# Patient Record
Sex: MALE | Race: WHITE | Employment: FULL TIME | URBAN - METROPOLITAN AREA
[De-identification: names, ages, dates, MRNs, and addresses within clinical notes are randomized per-mention and may not be internally consistent; named-entity substitution may affect disease eponyms.]

---

## 2017-02-03 DIAGNOSIS — Z72.0 TOBACCO USE: ICD-10-CM

## 2017-02-03 RX ORDER — VARENICLINE TARTRATE 1 MG/1
1 TABLET, FILM COATED ORAL 2 TIMES DAILY
Qty: 60 TAB | Refills: 2 | Status: SHIPPED | OUTPATIENT
Start: 2017-02-03 | End: 2017-07-14 | Stop reason: ALTCHOICE

## 2017-07-14 ENCOUNTER — OFFICE VISIT (OUTPATIENT)
Dept: FAMILY MEDICINE CLINIC | Age: 57
End: 2017-07-14

## 2017-07-14 VITALS
SYSTOLIC BLOOD PRESSURE: 120 MMHG | OXYGEN SATURATION: 93 % | HEIGHT: 72 IN | BODY MASS INDEX: 31.29 KG/M2 | WEIGHT: 231 LBS | TEMPERATURE: 97.8 F | HEART RATE: 72 BPM | RESPIRATION RATE: 16 BRPM | DIASTOLIC BLOOD PRESSURE: 80 MMHG

## 2017-07-14 DIAGNOSIS — R74.01 TRANSAMINITIS: ICD-10-CM

## 2017-07-14 DIAGNOSIS — G89.29 CHRONIC PAIN OF LEFT ANKLE: ICD-10-CM

## 2017-07-14 DIAGNOSIS — Z87.891 CESSATION OF TOBACCO USE IN PREVIOUS 12 MONTHS: ICD-10-CM

## 2017-07-14 DIAGNOSIS — R73.03 PREDIABETES: ICD-10-CM

## 2017-07-14 DIAGNOSIS — E83.110 HEREDITARY HEMOCHROMATOSIS (HCC): ICD-10-CM

## 2017-07-14 DIAGNOSIS — R79.89 LOW TESTOSTERONE LEVEL IN MALE: ICD-10-CM

## 2017-07-14 DIAGNOSIS — M25.572 CHRONIC PAIN OF LEFT ANKLE: ICD-10-CM

## 2017-07-14 DIAGNOSIS — N40.1 BENIGN PROSTATIC HYPERPLASIA WITH LOWER URINARY TRACT SYMPTOMS, UNSPECIFIED MORPHOLOGY: Primary | ICD-10-CM

## 2017-07-14 DIAGNOSIS — N52.1 ERECTILE DYSFUNCTION DUE TO DISEASES CLASSIFIED ELSEWHERE: ICD-10-CM

## 2017-07-14 RX ORDER — SILDENAFIL 50 MG/1
50 TABLET, FILM COATED ORAL AS NEEDED
Qty: 9 TAB | Refills: 11 | Status: SHIPPED | OUTPATIENT
Start: 2017-07-14 | End: 2017-07-31 | Stop reason: SDUPTHER

## 2017-07-14 RX ORDER — SILDENAFIL 50 MG/1
50 TABLET, FILM COATED ORAL AS NEEDED
Qty: 9 TAB | Refills: 11 | Status: SHIPPED | OUTPATIENT
Start: 2017-07-14 | End: 2017-07-14 | Stop reason: SDUPTHER

## 2017-07-14 NOTE — MR AVS SNAPSHOT
Visit Information Date & Time Provider Department Dept. Phone Encounter #  
 7/14/2017  3:15 PM Sivan Scott [address] [phone] [id number] Upcoming Health Maintenance Date Due Pneumococcal 19-64 Medium Risk (1 of 1 - PPSV23) 3/28/1977 INFLUENZA AGE 9 TO ADULT 8/1/2017 COLONOSCOPY 1/28/2021 COLON CANCER SCRN (BARIUM / CT COLO / FLX SIG) Q5 2/24/2021 DTaP/Tdap/Td series (2 - Td) 12/4/2023 Allergies as of 7/14/2017  Review Complete On: 7/14/2017 By: Radha Jacobs LPN Severity Noted Reaction Type Reactions Codeine Low 08/11/2014   Systemic Itching Current Immunizations  Never Reviewed Name Date Influenza Vaccine 1/19/2016 Influenza Vaccine (Quad) PF 10/31/2016 Influenza Vaccine PF 1/29/2015 Tdap 12/4/2013  4:19 PM  
  
 Not reviewed this visit You Were Diagnosed With   
  
 Codes Comments Benign prostatic hyperplasia with lower urinary tract symptoms, unspecified morphology    -  Primary ICD-10-CM: N40.1 ICD-9-CM: 600.01 Transaminitis     ICD-10-CM: R74.0 ICD-9-CM: 790.4 Prediabetes     ICD-10-CM: R73.03 
ICD-9-CM: 790.29 Cessation of tobacco use in previous 12 months     ICD-10-CM: Z87.891 ICD-9-CM: V15.82 Chronic pain of left ankle     ICD-10-CM: M25.572, G89.29 ICD-9-CM: 719.47, 338.29 Hereditary hemochromatosis ([facility] [address]     ICD-10-CM: E83.110 
ICD-9-CM: 275.01 Erectile dysfunction due to diseases classified elsewhere     ICD-10-CM: N52.1 ICD-9-CM: 607.84 Low testosterone level in male     ICD-10-CM: E29.1 ICD-9-CM: 257.2 Vitals BP Pulse Temp Resp Height(growth percentile) Weight(growth percentile) 120/80 (BP 1 Location: Left arm, BP Patient Position: Sitting) 72 97.8 °F (36.6 °C) (Oral) 16 6' (1.829 m) 231 lb (104.8 kg) SpO2 BMI Smoking Status 93% 31.33 kg/m2 Former Smoker Vitals History BMI and BSA Data Body Mass Index Body Surface Area  
 31.33 kg/m 2 2.31 m 2 Preferred Pharmacy Pharmacy Name Phone [facility] [address] [place], [place] [phone] Your Updated Medication List  
  
   
This list is accurate as of: 7/14/17  3:44 PM.  Always use your most recent med list.  
  
  
  
  
 diclofenac EC 75 mg EC tablet Commonly known as:  VOLTAREN Take 1 Tab by mouth two (2) times daily as needed. sildenafil citrate 50 mg tablet Commonly known as:  VIAGRA Take 1 Tab by mouth as needed. Prescriptions Sent to Pharmacy Refills  
 sildenafil citrate (VIAGRA) 50 mg tablet 11 Sig: Take 1 Tab by mouth as needed. Class: Normal  
 Pharmacy: [address], [place] [address].  #: [phone] Route: Oral  
  
We Performed the Following REFERRAL TO ORTHOPEDICS [ZBU689 Custom] Comments:  
 Please evaluate patient for chronic L ankle pain, evidence of inflammatory arthritis on 2014 MRI, hemochromatosis. Zoë weiss To-Do List   
 07/14/2017 Lab:  HEMOGLOBIN A1C W/O EAG   
  
 07/14/2017 Lab:  NICOTINE/COTININE, UR, QT   
  
 07/14/2017 Lab:  PSA W/ REFLX FREE PSA Referral Information Referral ID Referred By Referred To  
  
 [id number] Norma Rocha V Not Available Visits Status Start Date End Date 1 New Request 7/14/17 7/14/18 If your referral has a status of pending review or denied, additional information will be sent to support the outcome of this decision. Introducing [facility] & HEALTH SERVICES! Dear Kam Zendejasore: Thank you for requesting a [employer] account. Our records indicate that you already have an active [employer] account. You can access your account anytime at [email or web address] Did you know that you can access your hospital and ER discharge instructions at any time in [employer]?   You can also review all of your test results from your hospital stay or ER visit. Additional Information If you have questions, please visit the Frequently Asked Questions section of the myL[employer]O website at [email or web address] Remember, [employer] is NOT to be used for urgent needs. For medical emergencies, dial 911. Now available from your iPhone and Android! Please provide this summary of care documentation to your next provider. Your primary care clinician is listed as Miguelito Taylor. If you have any questions after today's visit, please call [phone].

## 2017-07-14 NOTE — PROGRESS NOTES
Assessment/Plan:    1. Benign prostatic hyperplasia with lower urinary tract symptoms, unspecified morphology. Will also check A1c as polyuria may be from DM  - PSA W/ REFLX FREE PSA; Future  -declined flomax    2. Transaminitis  -secondary to hemochromatosis and fatty liver    3. Prediabetes  - HEMOGLOBIN A1C W/O EAG; Future    4. Cessation of tobacco use in previous 12 months  - NICOTINE/COTININE, UR, QT; Future    5. Chronic pain of left ankle- in setting of Hereditary hemochromatosis and inflammatory arthritis on MRI  - REFERRAL TO ORTHOPEDICS    6. Erectile dysfunction due to diseases classified elsewhere- related to low testosterone  - sildenafil citrate (VIAGRA) 50 mg tablet; Take 1 Tab by mouth as needed. Dispense: 9 Tab; Refill: 11    7. Low T- given fatigue and ED, will rechk. Pt wishes to consider trial of tesosterone. The plan was discussed with the patient. The patient verbalized understanding and is in agreement with the plan. All medication potential side effects were discussed with the patient. Health Maintenance:   Health Maintenance   Topic Date Due    Pneumococcal 19-64 Medium Risk (1 of 1 - PPSV23) 03/28/1977    INFLUENZA AGE 9 TO ADULT  08/01/2017    COLONOSCOPY  01/28/2021    COLON CANCER SCRN (BARIUM / CT COLO / FLX SIG) Q5  02/24/2021    DTaP/Tdap/Td series (2 - Td) 12/04/2023    Hepatitis C Screening  Completed       Shaheed Simpson is a 59 y.o. male and presents with Ankle Pain; Urinary Frequency; and Hemorrhoids     Subjective:  Pt c/o continued L ankle pain. Has hemochromatosis. Pt notes he has hemorrhoids. Has intermittent maroon colored stools that he thinks is related to his hemorrhoids. Up to date with colo. Pt c/o difficulty emptying his bladder, dribbling, difficulty starting his stream, polyuria, nocturia. Has erectile dysfunction. States he has low energy. Wonders if it's related to his low testosterone.       Quit smoking and wishes to have nicotine test for insurance purposes. ROS:  Constitutional: No recent weight change. No weakness/+fatigue. No f/c. Cardiovascular: No CP/palpitations. No CARDOZA/orthopnea/PND. Respiratory: No cough/sputum, dyspnea, wheezing. Gastointestinal: No dysphagia, reflux. No n/v. No constipation/diarrhea.  +rectal bleeding. Genitourinary: No dysuria, urinary hesitancy, nocturia, hematuria. No incontinence. Musculoskeletal: + joint pain/stiffness. No muscle pain/tenderness. The problem list was updated as a part of today's visit. Patient Active Problem List   Diagnosis Code    Tobacco dependence F17.200    Transaminitis R74.0    Hepatomegaly R16.0    Prehypertension R03.0    Bilateral wrist pain M25.531, M25.532    History of carpal tunnel release Z98.890    Exposure to hepatitis B Z20.5    Post-traumatic osteonecrosis of left ankle (HCC) M87.272    HLD (hyperlipidemia) E78.5    Hemochromatosis R66.211    Mild diastolic dysfunction D74.0    Prediabetes R73.03       The PSH, FH were reviewed. SH:  Social History   Substance Use Topics    Smoking status: Former Smoker     Packs/day: 0.50     Years: 45.00     Types: Cigarettes     Quit date: 5/1/2016    Smokeless tobacco: Never Used    Alcohol use Yes      Comment: very little       Medications/Allergies:  Current Outpatient Prescriptions on File Prior to Visit   Medication Sig Dispense Refill    diclofenac EC (VOLTAREN) 75 mg EC tablet Take 1 Tab by mouth two (2) times daily as needed. 180 Tab 3     No current facility-administered medications on file prior to visit.          Allergies   Allergen Reactions    Codeine Itching       Objective:  Visit Vitals    /80 (BP 1 Location: Left arm, BP Patient Position: Sitting)    Pulse 72    Temp 97.8 °F (36.6 °C) (Oral)    Resp 16    Ht 6' (1.829 m)    Wt 231 lb (104.8 kg)    SpO2 93%    BMI 31.33 kg/m2      Constitutional: Well developed, nourished, no distress, alert, obese habitus   CV: S1, S2.  RRR. No murmurs/rubs. No thrills palpated. No carotid bruits. Intact distal pulses. Pulm: No abnormalities on inspection. Clear to auscultation bilaterally. No wheezing/rhonchi. Normal effort. GI: Soft, nontender, nondistended. Normal active bowel sounds. MS: Gait antalgic. 1+ ankle and pedal edema on L. Labwork and Ancillary Studies:    CBC w/Diff  Lab Results   Component Value Date/Time    WBC 7.4 10/06/2016 07:04 AM    HGB 15.7 10/06/2016 07:04 AM    PLATELET 572 08/04/8763 07:04 AM         Basic Metabolic Profile/LFTs  Lab Results   Component Value Date/Time    Sodium 140 10/06/2016 07:04 AM    Potassium 4.4 10/06/2016 07:04 AM    Chloride 106 10/06/2016 07:04 AM    CO2 27 10/06/2016 07:04 AM    Anion gap 7 10/06/2016 07:04 AM    Glucose 127 10/06/2016 07:04 AM    BUN 19 10/06/2016 07:04 AM    Creatinine 1.08 10/06/2016 07:04 AM    BUN/Creatinine ratio 18 10/06/2016 07:04 AM    GFR est AA >60 10/06/2016 07:04 AM    GFR est non-AA >60 10/06/2016 07:04 AM    Calcium 8.8 10/06/2016 07:04 AM      Lab Results   Component Value Date/Time    ALT (SGPT) 64 10/06/2016 07:04 AM    AST (SGOT) 45 10/06/2016 07:04 AM    Alk.  phosphatase 91 10/06/2016 07:04 AM    Bilirubin, total 1.0 10/06/2016 07:04 AM       Cholesterol  Lab Results   Component Value Date/Time    Cholesterol, total 207 10/06/2016 07:04 AM    HDL Cholesterol 44 10/06/2016 07:04 AM    LDL, calculated 102.8 10/06/2016 07:04 AM    Triglyceride 301 10/06/2016 07:04 AM    CHOL/HDL Ratio 4.7 10/06/2016 07:04 AM

## 2017-07-14 NOTE — PROGRESS NOTES
Gurmeet Sharma is a 59 y.o. male here today for ankle pain , urinary /prostate issue     1. Have you been to the ER, urgent care clinic or hospitalized since your last visit? NO.     2. Have you seen or consulted any other health care providers outside of the [facility] since your last visit (Include any pap smears or colon screening)? YES      Do you have an Advanced Directive? NO    Would you like information on Advanced Directives?  NO    Learning Assessment 2/10/2015   PRIMARY LEARNER Patient   HIGHEST LEVEL OF EDUCATION - PRIMARY LEARNER  > 4 YEARS OF COLLEGE   BARRIERS PRIMARY LEARNER NONE   CO-LEARNER CAREGIVER No   PRIMARY LANGUAGE ENGLISH   LEARNER PREFERENCE PRIMARY DEMONSTRATION     -   ANSWERED BY patient   RELATIONSHIP SELF

## 2017-07-26 ENCOUNTER — HOSPITAL ENCOUNTER (OUTPATIENT)
Dept: LAB | Age: 57
Discharge: HOME | End: 2017-07-26
Payer: COMMERCIAL

## 2017-07-26 DIAGNOSIS — R73.03 PREDIABETES: ICD-10-CM

## 2017-07-26 DIAGNOSIS — Z87.891 CESSATION OF TOBACCO USE IN PREVIOUS 12 MONTHS: ICD-10-CM

## 2017-07-26 DIAGNOSIS — R79.89 LOW TESTOSTERONE LEVEL IN MALE: ICD-10-CM

## 2017-07-26 DIAGNOSIS — N40.1 BENIGN PROSTATIC HYPERPLASIA WITH LOWER URINARY TRACT SYMPTOMS, UNSPECIFIED MORPHOLOGY: ICD-10-CM

## 2017-07-26 LAB — HBA1C MFR BLD: 10.7 % (ref 4.2–5.6)

## 2017-07-26 PROCEDURE — 84403 ASSAY OF TOTAL TESTOSTERONE: CPT | Performed by: INTERNAL MEDICINE

## 2017-07-26 PROCEDURE — 80307 DRUG TEST PRSMV CHEM ANLYZR: CPT | Performed by: INTERNAL MEDICINE

## 2017-07-26 PROCEDURE — 84153 ASSAY OF PSA TOTAL: CPT | Performed by: INTERNAL MEDICINE

## 2017-07-26 PROCEDURE — 83002 ASSAY OF GONADOTROPIN (LH): CPT | Performed by: INTERNAL MEDICINE

## 2017-07-26 PROCEDURE — 83036 HEMOGLOBIN GLYCOSYLATED A1C: CPT | Performed by: INTERNAL MEDICINE

## 2017-07-26 PROCEDURE — 36415 COLL VENOUS BLD VENIPUNCTURE: CPT | Performed by: INTERNAL MEDICINE

## 2017-07-27 LAB
COTININE UR QL SCN: NEGATIVE NG/ML
DRUG SCREEN COMMENT:, 753798: NORMAL
PSA SERPL-MCNC: 0.3 NG/ML (ref 0–4)
REFLEX CRITERIA: NORMAL
TESTOST FREE SERPL-MCNC: 6.8 PG/ML (ref 7.2–24)
TESTOST SERPL-MCNC: 170 NG/DL (ref 264–916)

## 2017-07-28 DIAGNOSIS — E29.1 HYPOGONADISM IN MALE: Primary | ICD-10-CM

## 2017-07-28 DIAGNOSIS — E83.110 HEREDITARY HEMOCHROMATOSIS (HCC): ICD-10-CM

## 2017-07-28 LAB — LH SERPL-ACNC: 5.7 MIU/ML

## 2017-07-28 RX ORDER — METFORMIN HYDROCHLORIDE 500 MG/1
500 TABLET, EXTENDED RELEASE ORAL
Qty: 90 TAB | Refills: 0 | Status: SHIPPED | OUTPATIENT
Start: 2017-07-28 | End: 2017-07-31 | Stop reason: SDUPTHER

## 2017-07-31 DIAGNOSIS — N52.1 ERECTILE DYSFUNCTION DUE TO DISEASES CLASSIFIED ELSEWHERE: ICD-10-CM

## 2017-07-31 RX ORDER — METFORMIN HYDROCHLORIDE 500 MG/1
500 TABLET, EXTENDED RELEASE ORAL
Qty: 90 TAB | Refills: 0 | Status: SHIPPED | OUTPATIENT
Start: 2017-07-31 | End: 2017-10-27 | Stop reason: SDUPTHER

## 2017-07-31 RX ORDER — SILDENAFIL 50 MG/1
50 TABLET, FILM COATED ORAL AS NEEDED
Qty: 9 TAB | Refills: 11 | Status: SHIPPED | OUTPATIENT
Start: 2017-07-31 | End: 2017-10-17 | Stop reason: SDUPTHER

## 2017-08-18 ENCOUNTER — TELEPHONE (OUTPATIENT)
Dept: FAMILY MEDICINE CLINIC | Age: 57
End: 2017-08-18

## 2017-08-18 RX ORDER — INSULIN PUMP SYRINGE, 3 ML
EACH MISCELLANEOUS
Qty: 1 KIT | Refills: 0 | Status: CANCELLED | OUTPATIENT
Start: 2017-08-18

## 2017-08-18 RX ORDER — LANCETS
EACH MISCELLANEOUS
Qty: 1 EACH | Refills: 3 | Status: CANCELLED | OUTPATIENT
Start: 2017-08-18

## 2017-08-18 NOTE — TELEPHONE ENCOUNTER
Pt is requesting a Rx for a new blood sugar meter, test strips and lancets. Pt is not sure what brand his insurance will cover.   Pt has [facility]

## 2017-08-23 RX ORDER — INSULIN PUMP SYRINGE, 3 ML
EACH MISCELLANEOUS
Qty: 1 KIT | Refills: 0 | Status: SHIPPED | OUTPATIENT
Start: 2017-08-23 | End: 2019-01-31 | Stop reason: SDUPTHER

## 2017-08-23 RX ORDER — LANCETS
EACH MISCELLANEOUS
Qty: 100 EACH | Refills: 3 | Status: SHIPPED | OUTPATIENT
Start: 2017-08-23 | End: 2019-01-31 | Stop reason: SDUPTHER

## 2017-08-24 RX ORDER — BLOOD-GLUCOSE METER
1 EACH MISCELLANEOUS DAILY
Qty: 1 EACH | Refills: 0 | Status: SHIPPED | OUTPATIENT
Start: 2017-08-24 | End: 2019-01-31 | Stop reason: SDUPTHER

## 2017-08-24 RX ORDER — BLOOD-GLUCOSE CONTROL, NORMAL
EACH MISCELLANEOUS
Qty: 100 LANCET | Refills: 3 | Status: SHIPPED | OUTPATIENT
Start: 2017-08-24 | End: 2020-01-09 | Stop reason: ALTCHOICE

## 2017-09-08 DIAGNOSIS — R73.03 PREDIABETES: Primary | ICD-10-CM

## 2017-09-08 DIAGNOSIS — E78.00 PURE HYPERCHOLESTEROLEMIA: Primary | ICD-10-CM

## 2017-09-11 ENCOUNTER — HOSPITAL ENCOUNTER (OUTPATIENT)
Dept: LAB | Age: 57
Discharge: HOME | End: 2017-09-11
Payer: COMMERCIAL

## 2017-09-11 DIAGNOSIS — E78.00 PURE HYPERCHOLESTEROLEMIA: ICD-10-CM

## 2017-09-11 DIAGNOSIS — R73.03 PREDIABETES: ICD-10-CM

## 2017-09-11 LAB
CHOLEST SERPL-MCNC: 206 MG/DL
HBA1C MFR BLD: 7.6 % (ref 4.2–5.6)
HDLC SERPL-MCNC: 42 MG/DL (ref 40–60)
HDLC SERPL: 4.9 {RATIO} (ref 0–5)
LDLC SERPL CALC-MCNC: 100.8 MG/DL (ref 0–100)
LIPID PROFILE,FLP: ABNORMAL
TRIGL SERPL-MCNC: 316 MG/DL (ref ?–150)
VLDLC SERPL CALC-MCNC: 63.2 MG/DL

## 2017-09-11 PROCEDURE — 36415 COLL VENOUS BLD VENIPUNCTURE: CPT | Performed by: INTERNAL MEDICINE

## 2017-09-11 PROCEDURE — 80061 LIPID PANEL: CPT | Performed by: INTERNAL MEDICINE

## 2017-09-11 PROCEDURE — 83036 HEMOGLOBIN GLYCOSYLATED A1C: CPT | Performed by: INTERNAL MEDICINE

## 2017-09-27 DIAGNOSIS — E83.110 HEREDITARY HEMOCHROMATOSIS (HCC): ICD-10-CM

## 2017-09-27 DIAGNOSIS — R74.01 TRANSAMINITIS: Primary | ICD-10-CM

## 2017-09-27 RX ORDER — DICLOFENAC SODIUM 75 MG/1
75 TABLET, DELAYED RELEASE ORAL
Qty: 180 TAB | Refills: 3 | Status: SHIPPED | OUTPATIENT
Start: 2017-09-27 | End: 2018-10-24 | Stop reason: SDUPTHER

## 2017-10-05 ENCOUNTER — HOSPITAL ENCOUNTER (OUTPATIENT)
Dept: ULTRASOUND IMAGING | Age: 57
Discharge: HOME | End: 2017-10-05
Attending: INTERNAL MEDICINE
Payer: COMMERCIAL

## 2017-10-05 DIAGNOSIS — R74.01 TRANSAMINITIS: ICD-10-CM

## 2017-10-05 PROCEDURE — 76705 ECHO EXAM OF ABDOMEN: CPT

## 2017-10-12 ENCOUNTER — HOSPITAL ENCOUNTER (OUTPATIENT)
Dept: LAB | Age: 57
Discharge: HOME | End: 2017-10-12
Payer: COMMERCIAL

## 2017-10-12 DIAGNOSIS — R74.01 TRANSAMINITIS: ICD-10-CM

## 2017-10-12 DIAGNOSIS — E83.110 HEREDITARY HEMOCHROMATOSIS (HCC): ICD-10-CM

## 2017-10-12 LAB
ALBUMIN SERPL-MCNC: 3.8 G/DL (ref 3.4–5)
ALBUMIN/GLOB SERPL: 1.1 {RATIO} (ref 0.8–1.7)
ALP SERPL-CCNC: 97 U/L (ref 45–117)
ALT SERPL-CCNC: 73 U/L (ref 16–61)
ANION GAP SERPL CALC-SCNC: 9 MMOL/L (ref 3–18)
AST SERPL-CCNC: 61 U/L (ref 15–37)
BILIRUB SERPL-MCNC: 0.4 MG/DL (ref 0.2–1)
BUN SERPL-MCNC: 24 MG/DL (ref 7–18)
BUN/CREAT SERPL: 23 (ref 12–20)
CALCIUM SERPL-MCNC: 8.9 MG/DL (ref 8.5–10.1)
CHLORIDE SERPL-SCNC: 103 MMOL/L (ref 100–108)
CO2 SERPL-SCNC: 27 MMOL/L (ref 21–32)
CREAT SERPL-MCNC: 1.04 MG/DL (ref 0.6–1.3)
ERYTHROCYTE [DISTWIDTH] IN BLOOD BY AUTOMATED COUNT: 12.9 % (ref 11.6–14.5)
GLOBULIN SER CALC-MCNC: 3.4 G/DL (ref 2–4)
GLUCOSE SERPL-MCNC: 182 MG/DL (ref 74–99)
HCT VFR BLD AUTO: 41.5 % (ref 36–48)
HGB BLD-MCNC: 14 G/DL (ref 13–16)
MCH RBC QN AUTO: 32.4 PG (ref 24–34)
MCHC RBC AUTO-ENTMCNC: 33.7 G/DL (ref 31–37)
MCV RBC AUTO: 96.1 FL (ref 74–97)
PLATELET # BLD AUTO: 194 K/UL (ref 135–420)
PMV BLD AUTO: 9.2 FL (ref 9.2–11.8)
POTASSIUM SERPL-SCNC: 4.5 MMOL/L (ref 3.5–5.5)
PROT SERPL-MCNC: 7.2 G/DL (ref 6.4–8.2)
RBC # BLD AUTO: 4.32 M/UL (ref 4.7–5.5)
SODIUM SERPL-SCNC: 139 MMOL/L (ref 136–145)
WBC # BLD AUTO: 6 K/UL (ref 4.6–13.2)

## 2017-10-12 PROCEDURE — 80053 COMPREHEN METABOLIC PANEL: CPT | Performed by: INTERNAL MEDICINE

## 2017-10-12 PROCEDURE — 85027 COMPLETE CBC AUTOMATED: CPT | Performed by: INTERNAL MEDICINE

## 2017-10-12 PROCEDURE — 82105 ALPHA-FETOPROTEIN SERUM: CPT | Performed by: INTERNAL MEDICINE

## 2017-10-12 PROCEDURE — 36415 COLL VENOUS BLD VENIPUNCTURE: CPT | Performed by: INTERNAL MEDICINE

## 2017-10-13 LAB — AFP-TM SERPL-MCNC: 2.9 NG/ML (ref 0–8.3)

## 2017-10-17 DIAGNOSIS — N52.1 ERECTILE DYSFUNCTION DUE TO DISEASES CLASSIFIED ELSEWHERE: ICD-10-CM

## 2017-10-17 RX ORDER — SILDENAFIL 50 MG/1
50 TABLET, FILM COATED ORAL AS NEEDED
Qty: 9 TAB | Refills: 11 | Status: SHIPPED | OUTPATIENT
Start: 2017-10-17 | End: 2017-10-18 | Stop reason: SDUPTHER

## 2017-10-18 DIAGNOSIS — N52.1 ERECTILE DYSFUNCTION DUE TO DISEASES CLASSIFIED ELSEWHERE: ICD-10-CM

## 2017-10-18 RX ORDER — SILDENAFIL 50 MG/1
50 TABLET, FILM COATED ORAL
Qty: 9 TAB | Refills: 11 | Status: SHIPPED | OUTPATIENT
Start: 2017-10-18 | End: 2019-10-23 | Stop reason: ALTCHOICE

## 2017-10-27 ENCOUNTER — CLINICAL SUPPORT (OUTPATIENT)
Dept: FAMILY MEDICINE CLINIC | Age: 57
End: 2017-10-27

## 2017-10-27 DIAGNOSIS — Z23 ENCOUNTER FOR IMMUNIZATION: Primary | ICD-10-CM

## 2017-10-27 RX ORDER — METFORMIN HYDROCHLORIDE 500 MG/1
500 TABLET, EXTENDED RELEASE ORAL
Qty: 90 TAB | Refills: 0 | Status: SHIPPED | OUTPATIENT
Start: 2017-10-27 | End: 2018-02-27 | Stop reason: SDUPTHER

## 2017-10-27 NOTE — TELEPHONE ENCOUNTER
From: Trinh Merritt  To: Jen Linares MD  Sent: 10/27/2017 8:24 AM EDT  Subject: Medication Renewal Request    Original authorizing provider: MD Trinh Gilbert would like a refill of the following medications:  metFORMIN ER (GLUCOPHAGE XR) 500 mg tablet Jen Linares MD]    Preferred pharmacy: [facility] [address] [place] [address].     Comment:

## 2017-10-27 NOTE — PROGRESS NOTES
Flu shot Immunization/s administered 10/27/2017 by Katarzyna Martíenz LPN with guardian's consent. Patient tolerated procedure well. No reactions noted.

## 2017-11-09 PROBLEM — K74.00 FIBROSIS OF LIVER: Status: ACTIVE | Noted: 2017-11-09

## 2018-02-27 ENCOUNTER — OFFICE VISIT (OUTPATIENT)
Dept: FAMILY MEDICINE CLINIC | Age: 58
End: 2018-02-27

## 2018-02-27 VITALS
SYSTOLIC BLOOD PRESSURE: 116 MMHG | HEIGHT: 72 IN | BODY MASS INDEX: 32.91 KG/M2 | RESPIRATION RATE: 20 BRPM | OXYGEN SATURATION: 94 % | WEIGHT: 243 LBS | HEART RATE: 65 BPM | TEMPERATURE: 97.8 F | DIASTOLIC BLOOD PRESSURE: 70 MMHG

## 2018-02-27 DIAGNOSIS — E66.09 CLASS 1 OBESITY DUE TO EXCESS CALORIES WITH SERIOUS COMORBIDITY AND BODY MASS INDEX (BMI) OF 32.0 TO 32.9 IN ADULT: ICD-10-CM

## 2018-02-27 DIAGNOSIS — E11.9 TYPE 2 DIABETES MELLITUS WITHOUT COMPLICATION, WITHOUT LONG-TERM CURRENT USE OF INSULIN (HCC): Primary | ICD-10-CM

## 2018-02-27 LAB — HBA1C MFR BLD HPLC: 7.5 %

## 2018-02-27 RX ORDER — METFORMIN HYDROCHLORIDE 750 MG/1
750 TABLET, EXTENDED RELEASE ORAL
Qty: 90 TAB | Refills: 0 | Status: SHIPPED | OUTPATIENT
Start: 2018-02-27 | End: 2018-06-12 | Stop reason: SDUPTHER

## 2018-02-27 NOTE — PROGRESS NOTES
Health Maintenance Due   Topic Date Due    ZOSTER VACCINE AGE 60>  2018     Cara Hernandes is a 59 y.o. male (: 1960) presenting to address:    No chief complaint on file. Vitals:    18 0726   BP: 116/70   Pulse: 65   Resp: 20   Temp: 97.8 °F (36.6 °C)   TempSrc: Oral   SpO2: 94%   Weight: 243 lb (110.2 kg)   Height: 6' (1.829 m)   PainSc:   0 - No pain       Hearing/Vision:   No exam data present    Learning Assessment:     Learning Assessment 2/10/2015   PRIMARY LEARNER Patient   HIGHEST LEVEL OF EDUCATION - PRIMARY LEARNER  > 4 YEARS OF COLLEGE   BARRIERS PRIMARY LEARNER NONE   CO-LEARNER CAREGIVER No   PRIMARY LANGUAGE ENGLISH   LEARNER PREFERENCE PRIMARY DEMONSTRATION     -   ANSWERED BY patient   RELATIONSHIP SELF     Depression Screening:     PHQ over the last two weeks 2018   Little interest or pleasure in doing things Not at all   Feeling down, depressed or hopeless Not at all   Total Score PHQ 2 0     Fall Risk Assessment:   No flowsheet data found. Abuse Screening:     Abuse Screening Questionnaire 2/10/2015   Do you ever feel afraid of your partner? N   Are you in a relationship with someone who physically or mentally threatens you? N   Is it safe for you to go home? Y     Coordination of Care Questionaire:   1. Have you been to the ER, urgent care clinic since your last visit? Hospitalized since your last visit? No     2. Have you seen or consulted any other health care providers outside of the [address] [place] [address] since your last visit? Include any pap smears or colon screening. Yes, dilated eye exam and Dr. Lona Cruz hem/oc every 3 months     Advanced Directive:   1. Do you have an Advanced Directive? No     2. Would you like information on Advanced Directives?   Yes

## 2018-02-27 NOTE — PROGRESS NOTES
Assessment/Plan:    1. Type 2 diabetes mellitus without complication, without long-term current use of insulin (HCC)  -incr metformin to 750mg. Get eye note from optometry on [place]. F/u in 3mos. - AMB POC HEMOGLOBIN A1C  -  DIABETES FOOT EXAM  - metFORMIN ER (GLUCOPHAGE XR) 750 mg tablet; Take 1 Tab by mouth daily (with dinner). Dispense: 90 Tab; Refill: 0    2. Class 1 obesity due to excess calories with serious comorbidity and body mass index (BMI) of 32.0 to 32.9 in adult  -work on diet/wt loss. The plan was discussed with the patient. The patient verbalized understanding and is in agreement with the plan. All medication potential side effects were discussed with the patient. Health Maintenance:   Health Maintenance   Topic Date Due    ZOSTER VACCINE AGE 60>  01/28/2018    COLONOSCOPY  01/28/2021    COLON CANCER SCRN (BARIUM / CT COLO / FLX SIG) Q5  02/24/2021    DTaP/Tdap/Td series (2 - Td) 12/04/2023    Hepatitis C Screening  Completed    Influenza Age 5 to Adult  Completed       Ethan Glez is a 59 y.o. male and presents with Diabetes     Subjective:  Pt with hemachromatosis, recently diagnosed DM2 - was started on metformin. States it's causing blurred vision. He's gained weight. He's seen an optometrist on [facility]  For blurred vision. He states he's had varying amounts of blurred vision and the optometrist stated his vision was changing hourly. He only intermittently checks his sugars. A1c 7.5.    ROS:  Constitutional: No recent weight change. No weakness/fatigue. No f/c. HENT: No HA, dizziness. No hearing loss/tinnitus. No nasal congestion/discharge. Eyes: No change in vision, +/blurred vision. no eye pain/redness. Cardiovascular: No CP/palpitations. No CARDOZA/orthopnea/PND. Respiratory: No cough/sputum, dyspnea, wheezing. Gastointestinal: No dysphagia, reflux. No n/v. No constipation/diarrhea. No melena/rectal bleeding.    Genitourinary: No dysuria, urinary hesitancy, nocturia, hematuria. No incontinence. The problem list was updated as a part of today's visit. Patient Active Problem List   Diagnosis Code    Transaminitis R74.0    Hepatomegaly R16.0    Prehypertension R03.0    Bilateral wrist pain M25.531, M25.532    History of carpal tunnel release Z98.890    Exposure to hepatitis B Z20.5    Post-traumatic osteonecrosis of left ankle (HCC) M87.272    HLD (hyperlipidemia) E78.5    Hemochromatosis H72.367    Mild diastolic dysfunction L69.5    Prediabetes R73.03    Benign prostatic hyperplasia with lower urinary tract symptoms N40.1    Hypogonadism in male E29.1    Fibrosis of liver K74.0       The PSH, FH were reviewed. SH:  Social History   Substance Use Topics    Smoking status: Former Smoker     Packs/day: 0.50     Years: 45.00     Types: Cigarettes     Quit date: 5/1/2016    Smokeless tobacco: Never Used    Alcohol use Yes      Comment: very little       Medications/Allergies:  Current Outpatient Prescriptions on File Prior to Visit   Medication Sig Dispense Refill    metFORMIN ER (GLUCOPHAGE XR) 500 mg tablet Take 1 Tab by mouth daily (with dinner). 90 Tab 0    diclofenac EC (VOLTAREN) 75 mg EC tablet Take 1 Tab by mouth two (2) times daily as needed. 180 Tab 3    lancets (ONETOUCH DELICA LANCETS) 30 gauge misc Test bs daily E11.9 100 Lancet 3    glucose blood VI test strips (ONETOUCH ULTRA TEST) strip Test bs daily. E11.9 100 Strip 3    Blood-Glucose Meter (ONETOUCH VERIO FLEX) misc 1 Each by Does Not Apply route daily. 1 Each 0    Blood-Glucose Meter monitoring kit Test bs daily. Dispense per formulary. E11.9 1 Kit 0    glucose blood VI test strips (BLOOD GLUCOSE TEST) strip Test bs daily. E11.9. 100 Strip 3    Lancets misc Test bs daily. E11.9 100 Each 3    sildenafil citrate (VIAGRA) 50 mg tablet Take 1 Tab by mouth daily as needed. 9 Tab 11     No current facility-administered medications on file prior to visit. Allergies   Allergen Reactions    Codeine Itching       Objective:  Visit Vitals    /70 (BP 1 Location: Right arm, BP Patient Position: Sitting)    Pulse 65    Temp 97.8 °F (36.6 °C) (Oral)    Resp 20    Ht 6' (1.829 m)    Wt 243 lb (110.2 kg)    SpO2 94%    BMI 32.96 kg/m2      Constitutional: Well developed, nourished, no distress, alert, obese habitus   HENT: Exterior ears and tympanic membranes normal bilaterally. Supple neck. No thyromegaly or lymphadenopathy. Oropharynx clear and moist mucous membranes. Eyes: Conjunctiva normal. PERRL. CV: S1, S2.  RRR. No murmurs/rubs. No thrills palpated. No carotid bruits. Intact distal pulses. No edema. Pulm: No abnormalities on inspection. Clear to auscultation bilaterally. No wheezing/rhonchi. Normal effort. Monofilament nml bilat    Labwork and Ancillary Studies:    CBC w/Diff  Lab Results   Component Value Date/Time    WBC 6.0 10/12/2017 01:49 PM    HGB 14.0 10/12/2017 01:49 PM    PLATELET 809 07/55/9025 01:49 PM         Basic Metabolic Profile/LFTs  Lab Results   Component Value Date/Time    Sodium 139 10/12/2017 01:49 PM    Potassium 4.5 10/12/2017 01:49 PM    Chloride 103 10/12/2017 01:49 PM    CO2 27 10/12/2017 01:49 PM    Anion gap 9 10/12/2017 01:49 PM    Glucose 182 (H) 10/12/2017 01:49 PM    BUN 24 (H) 10/12/2017 01:49 PM    Creatinine 1.04 10/12/2017 01:49 PM    BUN/Creatinine ratio 23 (H) 10/12/2017 01:49 PM    GFR est AA >60 10/12/2017 01:49 PM    GFR est non-AA >60 10/12/2017 01:49 PM    Calcium 8.9 10/12/2017 01:49 PM      Lab Results   Component Value Date/Time    ALT (SGPT) 73 (H) 10/12/2017 01:49 PM    AST (SGOT) 61 (H) 10/12/2017 01:49 PM    Alk.  phosphatase 97 10/12/2017 01:49 PM    Bilirubin, total 0.4 10/12/2017 01:49 PM       Cholesterol  Lab Results   Component Value Date/Time    Cholesterol, total 206 (H) 09/11/2017 07:08 AM    HDL Cholesterol 42 09/11/2017 07:08 AM    LDL, calculated 100.8 (H) 09/11/2017 07:08 AM    Triglyceride 316 (H) 09/11/2017 07:08 AM    CHOL/HDL Ratio 4.9 09/11/2017 07:08 AM

## 2018-02-27 NOTE — MR AVS SNAPSHOT
Liset Cheung 
 
 
 [address] [facility] [id number] [phone] Patient: Edie Bone MRN: [id number] ADP:[birth year] Visit Information Date & Time Provider Department Dept. Phone Encounter #  
 2/27/2018  7:30 AM Mery Gr [phone] [id number] Follow-up Instructions Return in about 3 months (around 5/27/2018) for cpe. Upcoming Health Maintenance Date Due ZOSTER VACCINE AGE 60> 1/28/2018 COLONOSCOPY 1/28/2021 COLON CANCER SCRN (BARIUM / CT COLO / FLX SIG) Q5 2/24/2021 DTaP/Tdap/Td series (2 - Td) 12/4/2023 Allergies as of 2/27/2018  Review Complete On: 2/27/2018 By: Sonia Carmona LPN Severity Noted Reaction Type Reactions Codeine Low 08/11/2014   Systemic Itching Current Immunizations  Never Reviewed Name Date Influenza Vaccine 1/19/2016 Influenza Vaccine (Quad) PF 10/27/2017  2:11 PM, 10/31/2016 Influenza Vaccine PF 1/29/2015 Tdap 12/4/2013  4:19 PM  
  
 Not reviewed this visit You Were Diagnosed With   
  
 Codes Comments Type 2 diabetes mellitus without complication, without long-term current use of insulin (HCC)    -  Primary ICD-10-CM: E11.9 ICD-9-CM: 250.00 Class 1 obesity due to excess calories with serious comorbidity and body mass index (BMI) of 32.0 to 32.9 in adult     ICD-10-CM: E66.09, Z68.32 
ICD-9-CM: 278.00, V85.32 Vitals BP Pulse Temp Resp Height(growth percentile) Weight(growth percentile) 116/70 (BP 1 Location: Right arm, BP Patient Position: Sitting) 65 97.8 °F (36.6 °C) (Oral) 20 6' (1.829 m) 243 lb (110.2 kg) SpO2 BMI Smoking Status 94% 32.96 kg/m2 Former Smoker Vitals History BMI and BSA Data Body Mass Index Body Surface Area  
 32.96 kg/m 2 2.37 m 2 Preferred Pharmacy Pharmacy Name Phone [facility] [address] [place] [address] [phone] Your Updated Medication List  
  
   
This list is accurate as of 2/27/18  7:44 AM.  Always use your most recent med list.  
  
  
  
  
 * Blood-Glucose Meter monitoring kit Test bs daily. Dispense per formulary. E11.9 * Blood-Glucose Meter Misc Commonly known as:  ONETOUCH VERIO FLEX  
1 Each by Does Not Apply route daily. diclofenac EC 75 mg EC tablet Commonly known as:  VOLTAREN Take 1 Tab by mouth two (2) times daily as needed. * glucose blood VI test strips strip Commonly known as:  blood glucose test  
Test bs daily. E11.9.  
  
 * glucose blood VI test strips strip Commonly known as:  ONETOUCH ULTRA TEST Test bs daily. E11.9 * Lancets Misc Test bs daily. E11.9  
  
 * lancets 30 gauge Misc Commonly known as:  Nannie Marysol LANCETS Test bs daily E11.9  
  
 metFORMIN  mg tablet Commonly known as:  GLUCOPHAGE XR Take 1 Tab by mouth daily (with dinner). sildenafil citrate 50 mg tablet Commonly known as:  VIAGRA Take 1 Tab by mouth daily as needed. * Notice: This list has 6 medication(s) that are the same as other medications prescribed for you. Read the directions carefully, and ask your doctor or other care provider to review them with you. Prescriptions Sent to Pharmacy Refills  
 metFORMIN ER (GLUCOPHAGE XR) 750 mg tablet 0 Sig: Take 1 Tab by mouth daily (with dinner). Class: Normal  
 Pharmacy: [address] [place] [address]. Ph #: [phone] Route: Oral  
  
We Performed the Following AMB POC HEMOGLOBIN A1C [16592 CPT(R)] HM DIABETES FOOT EXAM [HM7 Custom] Follow-up Instructions Return in about 3 months (around 5/27/2018) for cpe. Introducing [facility] & HEALTH SERVICES! Dear Carlita Duarte: Thank you for requesting a [employer] account.   Our records indicate that you already have an active [employer] account. You can access your account anytime at [email or web address] Did you know that you can access your hospital and ER discharge instructions at any time in [employer]? You can also review all of your test results from your hospital stay or ER visit. Additional Information If you have questions, please visit the Frequently Asked Questions section of the [employer] website at [email or web address] Remember, [employer] is NOT to be used for urgent needs. For medical emergencies, dial 911. Now available from your iPhone and Android! Please provide this summary of care documentation to your next provider. Your primary care clinician is listed as Miguelito Taylor. If you have any questions after today's visit, please call [phone].

## 2018-06-12 DIAGNOSIS — E11.9 TYPE 2 DIABETES MELLITUS WITHOUT COMPLICATION, WITHOUT LONG-TERM CURRENT USE OF INSULIN (HCC): ICD-10-CM

## 2018-06-12 RX ORDER — METFORMIN HYDROCHLORIDE 750 MG/1
750 TABLET, EXTENDED RELEASE ORAL
Qty: 90 TAB | Refills: 0 | Status: SHIPPED | OUTPATIENT
Start: 2018-06-12 | End: 2018-09-21 | Stop reason: SDUPTHER

## 2018-06-12 NOTE — TELEPHONE ENCOUNTER
From: Deepak Gallo  To: Felipe Barlow MD  Sent: 6/12/2018 8:07 AM EDT  Subject: Medication Renewal Request    Original authorizing provider: MD Deepak Kruse would like a refill of the following medications:  metFORMIN ER (GLUCOPHAGE XR) 750 mg tablet Felipe Barlow MD]    Preferred pharmacy: [address] [place] [address]. Comment:  Please call [facility] and allow refill - [phone]. Thanks.

## 2018-06-20 DIAGNOSIS — E11.9 TYPE 2 DIABETES MELLITUS WITHOUT COMPLICATION, WITHOUT LONG-TERM CURRENT USE OF INSULIN (HCC): Primary | ICD-10-CM

## 2018-07-23 ENCOUNTER — TELEPHONE (OUTPATIENT)
Dept: FAMILY MEDICINE CLINIC | Age: 58
End: 2018-07-23

## 2018-08-02 ENCOUNTER — OFFICE VISIT (OUTPATIENT)
Dept: FAMILY MEDICINE CLINIC | Age: 58
End: 2018-08-02

## 2018-08-02 VITALS
TEMPERATURE: 98 F | BODY MASS INDEX: 31.02 KG/M2 | SYSTOLIC BLOOD PRESSURE: 122 MMHG | RESPIRATION RATE: 18 BRPM | OXYGEN SATURATION: 97 % | HEART RATE: 66 BPM | DIASTOLIC BLOOD PRESSURE: 78 MMHG | WEIGHT: 229 LBS | HEIGHT: 72 IN

## 2018-08-02 DIAGNOSIS — R93.2 ABNORMAL LIVER ULTRASOUND: ICD-10-CM

## 2018-08-02 DIAGNOSIS — R74.01 TRANSAMINITIS: ICD-10-CM

## 2018-08-02 DIAGNOSIS — G89.29 CHRONIC PAIN OF LEFT ANKLE: ICD-10-CM

## 2018-08-02 DIAGNOSIS — Z23 ENCOUNTER FOR IMMUNIZATION: ICD-10-CM

## 2018-08-02 DIAGNOSIS — E11.9 TYPE 2 DIABETES MELLITUS WITHOUT COMPLICATION, WITHOUT LONG-TERM CURRENT USE OF INSULIN (HCC): Primary | ICD-10-CM

## 2018-08-02 DIAGNOSIS — M25.572 CHRONIC PAIN OF LEFT ANKLE: ICD-10-CM

## 2018-08-02 DIAGNOSIS — E83.110 HEREDITARY HEMOCHROMATOSIS (HCC): ICD-10-CM

## 2018-08-02 LAB — HBA1C MFR BLD HPLC: 8.7 %

## 2018-08-02 RX ORDER — GLIMEPIRIDE 2 MG/1
2 TABLET ORAL
Qty: 90 TAB | Refills: 0 | Status: SHIPPED | OUTPATIENT
Start: 2018-08-02 | End: 2018-11-04 | Stop reason: SDUPTHER

## 2018-08-02 NOTE — PROGRESS NOTES
Priscilla Moran is a 59 y.o. male (: 1960) presenting to address: Chief Complaint Patient presents with  Results  Joint Pain  
  pain scale 10 Vitals:  
 18 0451 BP: 122/78 Pulse: 66 Resp: 18 Temp: 98 °F (36.7 °C) TempSrc: Oral  
SpO2: 97% Weight: 229 lb (103.9 kg) Height: 6' (1.829 m) PainSc:   4 PainLoc: Generalized Hearing/Vision: No exam data present Learning Assessment:  
 
Learning Assessment 2/10/2015 PRIMARY LEARNER Patient HIGHEST LEVEL OF EDUCATION - PRIMARY LEARNER  > 4 YEARS OF COLLEGE  
BARRIERS PRIMARY LEARNER NONE  
CO-LEARNER CAREGIVER No  
PRIMARY LANGUAGE ENGLISH  
LEARNER PREFERENCE PRIMARY DEMONSTRATION  
  -  
ANSWERED BY patient RELATIONSHIP SELF Depression Screening: PHQ over the last two weeks 2018 Little interest or pleasure in doing things Not at all Feeling down, depressed, irritable, or hopeless Not at all Total Score PHQ 2 0 Fall Risk Assessment:  
No flowsheet data found. Abuse Screening:  
 
Abuse Screening Questionnaire 2018 Do you ever feel afraid of your partner? Shane Barragan Are you in a relationship with someone who physically or mentally threatens you? Shane Barragan Is it safe for you to go home? Lauren Ayala Coordination of Care Questionaire: 1. Have you been to the ER, urgent care clinic since your last visit? Hospitalized since your last visit? NO 
 
2. Have you seen or consulted any other health care providers outside of the [address] [place] [address] since your last visit? Include any pap smears or colon screening. YES Dr. Mariangel Patrick Advanced Directive: 1. Do you have an Advanced Directive? NO 
 
2. Would you like information on Advanced Directives?  NO

## 2018-08-02 NOTE — MR AVS SNAPSHOT
Antolin Barrios 
 
 
 [address] [facility] [address] [phone] Patient: Noralyn Fothergill MRN: [id number] TBX:[id number] Visit Information Date & Time Provider Department Dept. Phone Encounter #  
 8/2/2018  9:45 AM Mary Crouch, 3 [facility] [phone] [id number] Upcoming Health Maintenance Date Due MICROALBUMIN Q1 3/28/1968 EYE EXAM RETINAL OR DILATED Q1 3/28/1968 Pneumococcal 19-64 Medium Risk (1 of 1 - PPSV23) 3/28/1977 ZOSTER VACCINE AGE 60> 1/28/2018 Influenza Age 5 to Adult 8/1/2018 HEMOGLOBIN A1C Q6M 8/27/2018 LIPID PANEL Q1 9/11/2018 FOOT EXAM Q1 2/27/2019 COLONOSCOPY 1/28/2021 COLON CANCER SCRN (BARIUM / CT COLO / FLX SIG) Q5 2/24/2021 DTaP/Tdap/Td series (2 - Td) 12/4/2023 Allergies as of 8/2/2018  Review Complete On: 8/2/2018 By: Mildred Ash Severity Noted Reaction Type Reactions Codeine Low 08/11/2014   Systemic Itching Current Immunizations  Never Reviewed Name Date Influenza Vaccine 1/19/2016 Influenza Vaccine (Quad) PF 10/27/2017  2:11 PM, 10/31/2016 Influenza Vaccine PF 1/29/2015 Tdap 12/4/2013  4:19 PM  
  
 Not reviewed this visit You Were Diagnosed With   
  
 Codes Comments Type 2 diabetes mellitus without complication, without long-term current use of insulin (HCC)    -  Primary ICD-10-CM: E11.9 ICD-9-CM: 250.00 Abnormal liver ultrasound     ICD-10-CM: R93.2 ICD-9-CM: 793.3 Transaminitis     ICD-10-CM: R74.0 ICD-9-CM: 790.4 Chronic pain of left ankle     ICD-10-CM: M25.572, G89.29 ICD-9-CM: 719.47, 338.29 Hereditary hemochromatosis ([facility] [address]     ICD-10-CM: E83.110 
ICD-9-CM: 275.01 Vitals BP Pulse Temp Resp Height(growth percentile) Weight(growth percentile) 122/78 (BP 1 Location: Left arm, BP Patient Position: Sitting) 66 98 °F (36.7 °C) (Oral) 18 6' (1.829 m) 229 lb (103.9 kg) SpO2 BMI Smoking Status 97% 31.06 kg/m2 Former Smoker Vitals History BMI and BSA Data Body Mass Index Body Surface Area 31.06 kg/m 2 2.3 m 2 Preferred Pharmacy Pharmacy Name Phone [facility] [address] [place] [address] [phone] Your Updated Medication List  
  
   
This list is accurate as of 8/2/18  9:53 AM.  Always use your most recent med list.  
  
  
  
  
 * Blood-Glucose Meter monitoring kit Test bs daily. Dispense per formulary. E11.9 * Blood-Glucose Meter Misc Commonly known as:  ONETOUCH VERIO FLEX  
1 Each by Does Not Apply route daily. diclofenac EC 75 mg EC tablet Commonly known as:  VOLTAREN Take 1 Tab by mouth two (2) times daily as needed. glimepiride 2 mg tablet Commonly known as:  AMARYL Take 1 Tab by mouth every morning. * glucose blood VI test strips strip Commonly known as:  blood glucose test  
Test bs daily. E11.9.  
  
 * glucose blood VI test strips strip Commonly known as:  ONETOUCH ULTRA TEST Test bs daily. E11.9 * Lancets Misc Test bs daily. E11.9  
  
 * lancets 30 gauge Misc Commonly known as:  Daryl Jackman LANCETS Test bs daily E11.9  
  
 metFORMIN  mg tablet Commonly known as:  GLUCOPHAGE XR Take 1 Tab by mouth daily (with dinner). sildenafil citrate 50 mg tablet Commonly known as:  VIAGRA Take 1 Tab by mouth daily as needed. * Notice: This list has 6 medication(s) that are the same as other medications prescribed for you. Read the directions carefully, and ask your doctor or other care provider to review them with you. Prescriptions Sent to Pharmacy Refills  
 glimepiride (AMARYL) 2 mg tablet 0 Sig: Take 1 Tab by mouth every morning. Class: Normal  
 Pharmacy: [address] [place] [address], [address] [address]. Ph #: [phone] Route: Oral  
  
We Performed the Following AMB POC HEMOGLOBIN A1C [22285 CPT(R)] REFERRAL TO OPHTHALMOLOGY [REF57 Custom] REFERRAL TO ORTHOPEDICS [ULY370 Custom] Comments:  
 Moreno weiss To-Do List   
 08/02/2018 Lab:  AFP, TUMOR MARKER   
  
 08/02/2018 Lab:  MICROALBUMIN, UR, RAND W/ MICROALB/CREAT RATIO   
  
 08/02/2018 Imaging:  MRI ABD W WO CONT Referral Information Referral ID Referred By Referred To  
  
 [id number] Melissa Bull V Not Available Visits Status Start Date End Date 1 New Request 8/2/18 8/2/19 If your referral has a status of pending review or denied, additional information will be sent to support the outcome of this decision. Referral ID Referred By Referred To  
 [id number] Melissa Evens V Not Available Visits Status Start Date End Date 1 New Request 8/2/18 8/2/19 If your referral has a status of pending review or denied, additional information will be sent to support the outcome of this decision. Referral ID Referred By Referred To  
 [id number] [facility] MD Richard  
   [facility] Jean Allan , [address] [place] [address] Phone: [phone] Fax: [phone] Visits Status Start Date End Date 1 New Request 8/2/18 8/2/19 If your referral has a status of pending review or denied, additional information will be sent to support the outcome of this decision. Introducing [facility] & HEALTH [facility]! Dear Mo Moralez: Thank you for requesting a [employer] account. Our records indicate that you already have an active [employer] account. You can access your account anytime at [email or web address]. [employer] Did you know that you can access your hospital and ER discharge instructions at any time in [employer]? You can also review all of your test results from your hospital stay or ER visit. Additional Information If you have questions, please visit the Frequently Asked Questions section of the [employer] website at [email or web address] Remember, [employer] is NOT to be used for urgent needs. For medical emergencies, dial 911. Now available from your iPhone and Android! Please provide this summary of care documentation to your next provider. Your primary care clinician is listed as Miguelito Taylor. If you have any questions after today's visit, please call [phone].

## 2018-08-02 NOTE — PROGRESS NOTES
Pneumovax 23 Immunization/s administered 8/2/2018 by Harvest Primrose, LPN with guardian's consent. Patient tolerated procedure well. No reactions noted.

## 2018-08-02 NOTE — PROGRESS NOTES
Assessment/Plan: 1. Type 2 diabetes mellitus without complication, without long-term current use of insulin (HCC) 
-A1c 8.7. Add amaryl. Pt to get eye exam. F/u in3 mos. - AMB POC HEMOGLOBIN A1C 
- REFERRAL TO OPHTHALMOLOGY 
- glimepiride (AMARYL) 2 mg tablet; Take 1 Tab by mouth every morning. Dispense: 90 Tab; Refill: 0 
- MICROALBUMIN, UR, RAND W/ MICROALB/CREAT RATIO; Future 2. Abnormal liver ultrasound, transaminitis in setting of hemochromatosis 
-get MRI liver. Ck AFP. - MRI ABD W WO CONT; Future - AFP, TUMOR MARKER; Future 3. Chronic pain of left ankle 
-referral to [facility] for second opinion 
- REFERRAL TO ORTHOPEDICS 4. Encounter for immunization - [place] IMMUNIZ ADMIN,1 SINGLE/COMB VAC/TOXOID 
- Pneumococcal polysaccharide vaccine, 23-valent, adult or immunosuppressed pt dose The plan was discussed with the patient. The patient verbalized understanding and is in agreement with the plan. All medication potential side effects were discussed with the patient. Health Maintenance:  
Health Maintenance Topic Date Due  
 MICROALBUMIN Q1  03/28/1968  
 EYE EXAM RETINAL OR DILATED Q1  03/28/1968  Pneumococcal 19-64 Medium Risk (1 of 1 - PPSV23) 03/28/1977  ZOSTER VACCINE AGE 60>  01/28/2018  Influenza Age 5 to Adult  08/01/2018  HEMOGLOBIN A1C Q6M  08/27/2018  LIPID PANEL Q1  09/11/2018  
 FOOT EXAM Q1  02/27/2019  COLONOSCOPY  01/28/2021  COLON CANCER SCRN (BARIUM / CT COLO / FLX SIG) Q5  02/24/2021  
 DTaP/Tdap/Td series (2 - Td) 12/04/2023  Hepatitis C Screening  Completed Noralyn Fothergill is a 59 y.o. male and presents with Results and Joint Pain (pain scale 4/10) Subjective: 
DM 2 -a1c is . Had eye exam  
 
Notes his hematologist, Dr Marie Smith said he \"labs were getting worse\". U/s liver was ordered. I reviewed it with pt - showing ill defined nodules (new) - infectious vs neoplastic. Pt c/o ongoing L ankle pain.   Saw [address] [place] [address], who recommended pinning his ankle. He needs ROM b/c he likes to ride a motorcycle. He has worsening pain. He has h/o post-traumatic osteonecrosis of L ankle. He has pain over lateral malleolus and swelling. ROS: 
Constitutional: No recent weight change. No weakness/fatigue. No f/c. Cardiovascular: No CP/palpitations. No CARDOZA/orthopnea/PND. Respiratory: No cough/sputum, dyspnea, wheezing. Gastointestinal: No dysphagia, reflux. No n/v. No constipation/diarrhea. No melena/rectal bleeding. Genitourinary: No dysuria, urinary hesitancy, nocturia, hematuria. No incontinence. Musculoskeletal: + joint pain/stiffness. No muscle pain/tenderness. The problem list was updated as a part of today's visit. Patient Active Problem List  
Diagnosis Code  Transaminitis R74.0  Hepatomegaly R16.0  Prehypertension R03.0  Bilateral wrist pain M25.531, M25.532  
 History of carpal tunnel release Z98.890  
 Exposure to hepatitis B Z20.5  Post-traumatic osteonecrosis of left ankle ([address] L47.093  HLD (hyperlipidemia) E78.5  Hemochromatosis D86.352  
 Mild diastolic dysfunction L86.9  Benign prostatic hyperplasia with lower urinary tract symptoms N40.1  Hypogonadism in male E29.1  Fibrosis of liver K74.0  Type 2 diabetes mellitus without complication, without long-term current use of insulin (HCC) E11.9 The PSH, FH were reviewed. SH: Social History Substance Use Topics  Smoking status: Former Smoker Packs/day: 0.50 Years: 45.00 Types: Cigarettes Quit date: 5/1/2016  Smokeless tobacco: Never Used  Alcohol use Yes Comment: very little Medications/Allergies: 
Current Outpatient Prescriptions on File Prior to Visit Medication Sig Dispense Refill  metFORMIN ER (GLUCOPHAGE XR) 750 mg tablet Take 1 Tab by mouth daily (with dinner). 90 Tab 0  
 sildenafil citrate (VIAGRA) 50 mg tablet Take 1 Tab by mouth daily as needed. 9 Tab 11  diclofenac EC (VOLTAREN) 75 mg EC tablet Take 1 Tab by mouth two (2) times daily as needed. 180 Tab 3  
 lancets (ONETOUCH DELICA LANCETS) 30 gauge misc Test bs daily E11.9 100 Lancet 3  
 glucose blood VI test strips (ONETOUCH ULTRA TEST) strip Test bs daily. E11.9 100 Strip 3  Blood-Glucose Meter (ONETOUCH VERIO FLEX) misc 1 Each by Does Not Apply route daily. 1 Each 0  Blood-Glucose Meter monitoring kit Test bs daily. Dispense per formulary. E11.9 1 Kit 0  
 glucose blood VI test strips (BLOOD GLUCOSE TEST) strip Test bs daily. E11.9. 100 Strip 3  Lancets misc Test bs daily. E11.9 100 Each 3 No current facility-administered medications on file prior to visit. Allergies Allergen Reactions  Codeine Itching Objective: 
Visit Vitals  /78 (BP 1 Location: Left arm, BP Patient Position: Sitting)  Pulse 66  Temp 98 °F (36.7 °C) (Oral)  Resp 18  Ht 6' (1.829 m)  Wt 229 lb (103.9 kg)  SpO2 97%  BMI 31.06 kg/m2 Constitutional: Well developed, nourished, no distress, alert, obese habitus CV: S1, S2.  RRR. No murmurs/rubs. No thrills palpated. No carotid bruits. Intact distal pulses. No edema. No aortic bruits. Pulm: No abnormalities on inspection. Clear to auscultation bilaterally. No wheezing/rhonchi. Normal effort. GI: Soft, nontender, nondistended. Normal active bowel sounds. No hepatomegaly. MS: Gait normal.  +no pain over lateral malleolus with palpation. +pain with pROM in all directions of L ankle. MRI 2014 L ankle: Peroneus brevis and longus tendinosis and mild tenosynovitis. Probable interstitial tears of the peroneus longus tendon at the level of the lateral malleolus. There is prominent peroneal tubercle with marrow edema likely contributing to pathology. Degenerative changes with evidence of inflammation at the lateral tibiotalar and talofibular articulations.  Suggestion of heel valgus and possible lateral hindfoot impingement. Distal Achilles tendinosis with interstitial tear. U/s liver: Heterogeneous coarse liver parenchyma with multiple ill defined hypoechoic nodules as indeterminate finding. The finding is not typical for hemachromatosis and potential infectious or neoplastic process are suspected. Recommend dedicated liver MR for further evaluation. No cholelithiasis or biliary dilatation

## 2018-08-03 ENCOUNTER — HOSPITAL ENCOUNTER (OUTPATIENT)
Dept: LAB | Age: 58
Discharge: HOME | End: 2018-08-03
Payer: COMMERCIAL

## 2018-08-03 DIAGNOSIS — E11.9 TYPE 2 DIABETES MELLITUS WITHOUT COMPLICATION, WITHOUT LONG-TERM CURRENT USE OF INSULIN (HCC): ICD-10-CM

## 2018-08-03 LAB
CREAT UR-MCNC: 154.56 MG/DL (ref 30–125)
MICROALBUMIN UR-MCNC: 0.5 MG/DL (ref 0–3)
MICROALBUMIN/CREAT UR-RTO: 3 MG/G (ref 0–30)

## 2018-08-03 PROCEDURE — 82105 ALPHA-FETOPROTEIN SERUM: CPT | Performed by: INTERNAL MEDICINE

## 2018-08-03 PROCEDURE — 82043 UR ALBUMIN QUANTITATIVE: CPT | Performed by: INTERNAL MEDICINE

## 2018-08-03 PROCEDURE — 36415 COLL VENOUS BLD VENIPUNCTURE: CPT | Performed by: INTERNAL MEDICINE

## 2018-08-04 LAB — AFP-TM SERPL-MCNC: 3.4 NG/ML (ref 0–8.3)

## 2018-08-06 DIAGNOSIS — R93.2 ABNORMAL LIVER ULTRASOUND: Primary | ICD-10-CM

## 2018-08-14 PROBLEM — K76.9 LIVER LESION, RIGHT LOBE: Status: ACTIVE | Noted: 2018-08-14

## 2018-08-27 ENCOUNTER — OFFICE VISIT (OUTPATIENT)
Dept: FAMILY MEDICINE CLINIC | Age: 58
End: 2018-08-27

## 2018-08-27 VITALS
DIASTOLIC BLOOD PRESSURE: 78 MMHG | RESPIRATION RATE: 18 BRPM | BODY MASS INDEX: 32.1 KG/M2 | WEIGHT: 237 LBS | HEIGHT: 72 IN | OXYGEN SATURATION: 97 % | HEART RATE: 64 BPM | TEMPERATURE: 97.5 F | SYSTOLIC BLOOD PRESSURE: 132 MMHG

## 2018-08-27 DIAGNOSIS — E29.1 HYPOGONADISM IN MALE: ICD-10-CM

## 2018-08-27 DIAGNOSIS — K76.9 LIVER LESION, RIGHT LOBE: Primary | ICD-10-CM

## 2018-08-27 DIAGNOSIS — E83.110 HEREDITARY HEMOCHROMATOSIS (HCC): ICD-10-CM

## 2018-08-27 DIAGNOSIS — Z23 ENCOUNTER FOR IMMUNIZATION: ICD-10-CM

## 2018-08-27 DIAGNOSIS — E11.9 TYPE 2 DIABETES MELLITUS WITHOUT COMPLICATION, WITHOUT LONG-TERM CURRENT USE OF INSULIN (HCC): ICD-10-CM

## 2018-08-27 DIAGNOSIS — I51.9 MILD DIASTOLIC DYSFUNCTION: ICD-10-CM

## 2018-08-27 NOTE — PROGRESS NOTES
Assessment/Plan:    1. Liver lesion, right lobe secondary to hemochromatosis  -reassess with MRI in 3 mos. 2. Hypogonadism in male  -pt never went when referred last year. Will refer again.   - REFERRAL TO ENDOCRINOLOGY  - TESTOSTERONE, FREE+TOTAL; Future    4. Mild diastolic dysfunction and hemochromatosis- will do cardiac CT in Feb. To r/o cardiac disease given family hx. Will reassess EF with echo. - ECHO ADULT COMPLETE; Future    The plan was discussed with the patient. The patient verbalized understanding and is in agreement with the plan. All medication potential side effects were discussed with the patient. Health Maintenance:   Health Maintenance   Topic Date Due    EYE EXAM RETINAL OR DILATED Q1  03/28/1968    ZOSTER VACCINE AGE 60>  01/28/2018    Influenza Age 5 to Adult  08/01/2018    LIPID PANEL Q1  09/11/2018    HEMOGLOBIN A1C Q6M  02/02/2019    FOOT EXAM Q1  02/27/2019    MICROALBUMIN Q1  08/03/2019    COLONOSCOPY  01/28/2021    COLON CANCER SCRN (BARIUM / CT COLO / FLX SIG) Q5  02/24/2021    DTaP/Tdap/Td series (2 - Td) 12/04/2023    Hepatitis C Screening  Completed    Pneumococcal 19-64 Medium Risk  Completed       Ellie Mclaughlin is a 59 y.o. male and presents with Abnormal liver tests (follow up )     Subjective:  Hemachromatosis - with transaminitis. Some vague abnormalities on MRI that are suggestive of high grade dysplastic nodule. Was seen by Dr. Michael Banks, who recommended repeat MRI 3-6 mos. The patient continues to get therapeutic phlebotomy with iron levels within acceptable range. He has low testosterone. Was referred to endo last year, but never went. He c/o fatigue, low libido and ED. He has been reading about hemachromatosis and is worried about complications. We reviewed diabetes, joint pain, cirrhosis as his current complications from hemochromatosis. We also discussed his low testosterone.   Echo 2015 showed mild dysfunction and has no CHF sx.    ROS:  Constitutional: No recent weight change. No weakness/+fatigue. No f/c. Cardiovascular: No CP/palpitations. No CARDOZA/orthopnea/PND. Respiratory: No cough/sputum, dyspnea, wheezing. Gastointestinal: No dysphagia, reflux. No n/v. No constipation/diarrhea. No melena/rectal bleeding. The problem list was updated as a part of today's visit. Patient Active Problem List   Diagnosis Code    Transaminitis R74.0    Hepatomegaly R16.0    Prehypertension R03.0    Bilateral wrist pain M25.531, M25.532    History of carpal tunnel release Z98.890    Exposure to hepatitis B Z20.5    Post-traumatic osteonecrosis of left ankle (HCC) M87.272    HLD (hyperlipidemia) E78.5    Hemochromatosis E85.442    Mild diastolic dysfunction B32.8    Benign prostatic hyperplasia with lower urinary tract symptoms N40.1    Hypogonadism in male E29.1    Fibrosis of liver K74.0    Type 2 diabetes mellitus without complication, without long-term current use of insulin (HCC) E11.9    Liver lesion, right lobe K76.89       The PSH, FH were reviewed. SH:  Social History   Substance Use Topics    Smoking status: Former Smoker     Packs/day: 0.50     Years: 45.00     Types: Cigarettes     Quit date: 5/1/2016    Smokeless tobacco: Never Used    Alcohol use Yes      Comment: very little       Medications/Allergies:  Current Outpatient Prescriptions on File Prior to Visit   Medication Sig Dispense Refill    glimepiride (AMARYL) 2 mg tablet Take 1 Tab by mouth every morning. 90 Tab 0    metFORMIN ER (GLUCOPHAGE XR) 750 mg tablet Take 1 Tab by mouth daily (with dinner). 90 Tab 0    sildenafil citrate (VIAGRA) 50 mg tablet Take 1 Tab by mouth daily as needed. 9 Tab 11    diclofenac EC (VOLTAREN) 75 mg EC tablet Take 1 Tab by mouth two (2) times daily as needed.  180 Tab 3    lancets (ONETOUCH DELICA LANCETS) 30 gauge misc Test bs daily E11.9 100 Lancet 3    glucose blood VI test strips (ONETOUCH ULTRA TEST) strip Test bs daily. E11.9 100 Strip 3    Blood-Glucose Meter (ONETOUCH VERIO FLEX) misc 1 Each by Does Not Apply route daily. 1 Each 0    Blood-Glucose Meter monitoring kit Test bs daily. Dispense per formulary. E11.9 1 Kit 0    glucose blood VI test strips (BLOOD GLUCOSE TEST) strip Test bs daily. E11.9. 100 Strip 3    Lancets misc Test bs daily. E11.9 100 Each 3     No current facility-administered medications on file prior to visit. Allergies   Allergen Reactions    Codeine Itching       Objective:  Visit Vitals    /78 (BP 1 Location: Left arm, BP Patient Position: Sitting)    Pulse 64    Temp 97.5 °F (36.4 °C) (Oral)    Resp 18    Ht 6' (1.829 m)    Wt 237 lb (107.5 kg)    SpO2 97%    BMI 32.14 kg/m2      Constitutional: Well developed, nourished, no distress, alert, obese habitus   CV: S1, S2.  RRR. No murmurs/rubs. No thrills palpated. No carotid bruits. Intact distal pulses. No edema. No aortic bruits. Pulm: No abnormalities on inspection. Clear to auscultation bilaterally. No wheezing/rhonchi. Normal effort. GI: Soft, nontender, nondistended. Normal active bowel sounds. Neuro: A/O x 3. No focal motor or sensory deficits.  Speech normal.

## 2018-08-27 NOTE — PROGRESS NOTES
Andra Fuentes is a 59 y.o. male (: 1960) presenting to address:    Chief Complaint   Patient presents with    Abnormal liver tests     follow up        Vitals:    18 1114   BP: 132/78   Pulse: 64   Resp: 18   Temp: 97.5 °F (36.4 °C)   TempSrc: Oral   SpO2: 97%   Weight: 237 lb (107.5 kg)   Height: 6' (1.829 m)   PainSc:   4   PainLoc: Generalized       Hearing/Vision:   No exam data present    Learning Assessment:     Learning Assessment 2/10/2015   PRIMARY LEARNER Patient   HIGHEST LEVEL OF EDUCATION - PRIMARY LEARNER  > 4 YEARS OF COLLEGE   BARRIERS PRIMARY LEARNER NONE   CO-LEARNER CAREGIVER No   PRIMARY LANGUAGE ENGLISH   LEARNER PREFERENCE PRIMARY DEMONSTRATION     -   ANSWERED BY patient   RELATIONSHIP SELF     Depression Screening:     PHQ over the last two weeks 2018   Little interest or pleasure in doing things Not at all   Feeling down, depressed, irritable, or hopeless Not at all   Total Score PHQ 2 0     Fall Risk Assessment:   No flowsheet data found. Abuse Screening:     Abuse Screening Questionnaire 2018   Do you ever feel afraid of your partner? N   Are you in a relationship with someone who physically or mentally threatens you? N   Is it safe for you to go home? Y     Coordination of Care Questionaire:   1. Have you been to the ER, urgent care clinic since your last visit? Hospitalized since your last visit? NO    2. Have you seen or consulted any other health care providers outside of the [address] [place] [address] since your last visit? Include any pap smears or colon screening. NO    Advanced Directive:   1. Do you have an Advanced Directive? NO    2. Would you like information on Advanced Directives? NO      Flu shot Immunization/s administered 2018 by Katarzyna Martínez LPN with guardian's consent. Patient tolerated procedure well. No reactions noted.

## 2018-08-27 NOTE — MR AVS SNAPSHOT
Troy Montelongo 
 
 
 [address] [facility] [address] 
[phone] Patient: Bailey Cerna MRN: [id number] PJR:[id number] Visit Information Date & Time Provider Department Dept. Phone Encounter #  
 8/27/2018 11:30 AM Nando Padron, 3 [facility] [phone] [id number] Upcoming Health Maintenance Date Due  
 EYE EXAM RETINAL OR DILATED Q1 3/28/1968 ZOSTER VACCINE AGE 60> 1/28/2018 Influenza Age 5 to Adult 8/1/2018 LIPID PANEL Q1 9/11/2018 HEMOGLOBIN A1C Q6M 2/2/2019 FOOT EXAM Q1 2/27/2019 MICROALBUMIN Q1 8/3/2019 COLONOSCOPY 1/28/2021 DTaP/Tdap/Td series (2 - Td) 12/4/2023 Allergies as of 8/27/2018  Review Complete On: 8/27/2018 By: Nando Padron MD  
  
 Severity Noted Reaction Type Reactions Codeine Low 08/11/2014   Systemic Itching Current Immunizations  Never Reviewed Name Date Influenza Vaccine 1/19/2016 Influenza Vaccine (Quad) PF 10/27/2017  2:11 PM, 10/31/2016 Influenza Vaccine PF 1/29/2015 Pneumococcal Polysaccharide (PPSV-23) 8/2/2018 10:19 AM  
 Tdap 12/4/2013  4:19 PM  
  
 Not reviewed this visit You Were Diagnosed With   
  
 Codes Comments Liver lesion, right lobe    -  Primary ICD-10-CM: K76.89 
ICD-9-CM: 573.8 [facility]     ICD-10-CM: E83.110 
ICD-9-CM: 275.01 Hypogonadism in male     ICD-10-CM: E29.1 ICD-9-CM: 257.2 Mild diastolic dysfunction     LGL-90-FL: I51.9 ICD-9-CM: 429.9 Vitals BP Pulse Temp Resp Height(growth percentile) Weight(growth percentile) 132/78 (BP 1 Location: Left arm, BP Patient Position: Sitting) 64 97.5 °F (36.4 °C) (Oral) 18 6' (1.829 m) 237 lb (107.5 kg) SpO2 BMI Smoking Status 97% 32.14 kg/m2 Former Smoker Vitals History BMI and BSA Data Body Mass Index Body Surface Area  
 32.14 kg/m 2 2.34 m 2 Preferred Pharmacy Pharmacy Name Phone [facility] [address] [place], [place] [address] [phone] Your Updated Medication List  
  
   
This list is accurate as of 8/27/18 11:56 AM.  Always use your most recent med list.  
  
  
  
  
 * Blood-Glucose Meter monitoring kit Test bs daily. Dispense per formulary. E11.9 * Blood-Glucose Meter Misc Commonly known as:  ONETOUCH VERIO FLEX  
1 Each by Does Not Apply route daily. diclofenac EC 75 mg EC tablet Commonly known as:  VOLTAREN Take 1 Tab by mouth two (2) times daily as needed. glimepiride 2 mg tablet Commonly known as:  AMARYL Take 1 Tab by mouth every morning. * glucose blood VI test strips strip Commonly known as:  blood glucose test  
Test bs daily. E11.9.  
  
 * glucose blood VI test strips strip Commonly known as:  ONETOUCH ULTRA TEST Test bs daily. E11.9 * Lancets Misc Test bs daily. E11.9  
  
 * lancets 30 gauge Misc Commonly known as:  Lazarus Slipper LANCETS Test bs daily E11.9  
  
 metFORMIN  mg tablet Commonly known as:  GLUCOPHAGE XR Take 1 Tab by mouth daily (with dinner). sildenafil citrate 50 mg tablet Commonly known as:  VIAGRA Take 1 Tab by mouth daily as needed. * Notice: This list has 6 medication(s) that are the same as other medications prescribed for you. Read the directions carefully, and ask your doctor or other care provider to review them with you. We Performed the Following REFERRAL TO ENDOCRINOLOGY [MMQ42 Custom] To-Do List   
 08/27/2018 Echocardiography:  ECHO ADULT COMPLETE   
  
 08/27/2018 Lab:  TESTOSTERONE, FREE+TOTAL   
  
 11/13/2018 Imaging:  MRI ABD W CONT Referral Information Referral ID Referred By Referred To  
  
 [id number] Rosangela Marshall MD   
   [facility] [address] [place], [address] [place] [address] Phone: [phone] Fax: [phone] Visits Status Start Date End Date 1 New Request 8/27/18 8/27/19 If your referral has a status of pending review or denied, additional information will be sent to support the outcome of this decision. Referral ID Referred By Referred To  
 [id number] Olivia Perry V Not Available Visits Status Start Date End Date 1 New Request 8/27/18 8/27/19 If your referral has a status of pending review or denied, additional information will be sent to support the outcome of this decision. Introducing [facility]! Dear Enzo Found: Thank you for requesting a [employer] account. Our records indicate that you already have an active [employer] account. You can access your account anytime at [email or web address] Did you know that you can access your hospital and ER discharge instructions at any time in [employer]? You can also review all of your test results from your hospital stay or ER visit. Additional Information If you have questions, please visit the Frequently Asked Questions section of the [employer] website at [email or web address] Remember, [employer] is NOT to be used for urgent needs. For medical emergencies, dial 911. Now available from your iPhone and Android! Please provide this summary of care documentation to your next provider. Your primary care clinician is listed as Miguelito Taylor. If you have any questions after today's visit, please call [phone].

## 2018-08-29 ENCOUNTER — HOSPITAL ENCOUNTER (OUTPATIENT)
Dept: LAB | Age: 58
Discharge: HOME | End: 2018-08-29
Payer: COMMERCIAL

## 2018-08-29 DIAGNOSIS — E11.9 TYPE 2 DIABETES MELLITUS WITHOUT COMPLICATION, WITHOUT LONG-TERM CURRENT USE OF INSULIN (HCC): ICD-10-CM

## 2018-08-29 LAB
CHOLEST SERPL-MCNC: 211 MG/DL
HDLC SERPL-MCNC: 36 MG/DL (ref 40–60)
HDLC SERPL: 5.9 {RATIO} (ref 0–5)
LDLC SERPL CALC-MCNC: 100.8 MG/DL (ref 0–100)
LIPID PROFILE,FLP: ABNORMAL
TRIGL SERPL-MCNC: 371 MG/DL (ref ?–150)
VLDLC SERPL CALC-MCNC: 74.2 MG/DL

## 2018-08-29 PROCEDURE — 84403 ASSAY OF TOTAL TESTOSTERONE: CPT | Performed by: INTERNAL MEDICINE

## 2018-08-29 PROCEDURE — 80061 LIPID PANEL: CPT | Performed by: INTERNAL MEDICINE

## 2018-08-29 PROCEDURE — 36415 COLL VENOUS BLD VENIPUNCTURE: CPT | Performed by: INTERNAL MEDICINE

## 2018-08-30 LAB
TESTOST FREE SERPL-MCNC: 9.3 PG/ML (ref 6.6–18.1)
TESTOST SERPL-MCNC: 295 NG/DL (ref 264–916)

## 2018-09-07 DIAGNOSIS — R74.01 TRANSAMINITIS: ICD-10-CM

## 2018-09-07 DIAGNOSIS — R93.2 ABNORMAL LIVER ULTRASOUND: ICD-10-CM

## 2018-09-21 DIAGNOSIS — E11.9 TYPE 2 DIABETES MELLITUS WITHOUT COMPLICATION, WITHOUT LONG-TERM CURRENT USE OF INSULIN (HCC): ICD-10-CM

## 2018-09-21 RX ORDER — METFORMIN HYDROCHLORIDE 750 MG/1
750 TABLET, EXTENDED RELEASE ORAL
Qty: 90 TAB | Refills: 0 | Status: SHIPPED | OUTPATIENT
Start: 2018-09-21 | End: 2018-12-26 | Stop reason: SDUPTHER

## 2018-10-03 RX ORDER — VARENICLINE TARTRATE 25 MG
KIT ORAL
Qty: 1 DOSE PACK | Refills: 0 | Status: SHIPPED | OUTPATIENT
Start: 2018-10-03 | End: 2018-11-05 | Stop reason: DRUGHIGH

## 2018-10-24 RX ORDER — DICLOFENAC SODIUM 75 MG/1
TABLET, DELAYED RELEASE ORAL
Qty: 60 TAB | Refills: 11 | Status: SHIPPED | OUTPATIENT
Start: 2018-10-24 | End: 2019-11-11 | Stop reason: SDUPTHER

## 2018-11-05 RX ORDER — VARENICLINE TARTRATE 1 MG/1
1 TABLET, FILM COATED ORAL 2 TIMES DAILY
Qty: 180 TAB | Refills: 0 | Status: SHIPPED | OUTPATIENT
Start: 2018-11-05 | End: 2019-01-31 | Stop reason: ALTCHOICE

## 2018-12-19 DIAGNOSIS — E11.9 TYPE 2 DIABETES MELLITUS WITHOUT COMPLICATION, WITHOUT LONG-TERM CURRENT USE OF INSULIN (HCC): Primary | ICD-10-CM

## 2018-12-31 ENCOUNTER — CLINICAL SUPPORT (OUTPATIENT)
Dept: FAMILY MEDICINE CLINIC | Age: 58
End: 2018-12-31

## 2018-12-31 DIAGNOSIS — E11.9 TYPE 2 DIABETES MELLITUS WITHOUT COMPLICATION, WITHOUT LONG-TERM CURRENT USE OF INSULIN (HCC): Primary | ICD-10-CM

## 2018-12-31 DIAGNOSIS — Z23 ENCOUNTER FOR IMMUNIZATION: ICD-10-CM

## 2018-12-31 LAB — HBA1C MFR BLD HPLC: 8 %

## 2018-12-31 NOTE — PROGRESS NOTES
Immunization/s administered 12/31/2018 by Alysha Dumont LPN with guardian's consent. Patient tolerated procedure well. No reactions noted. Shingrix #1, pt to return in 2 months.

## 2019-01-16 ENCOUNTER — TELEPHONE (OUTPATIENT)
Dept: FAMILY MEDICINE CLINIC | Age: 59
End: 2019-01-16

## 2019-01-16 NOTE — TELEPHONE ENCOUNTER
Received vm on behalf of pt from Dr Eran Warren from [facility]. She states they are offering pt tele-nutritional counseling as a benefit from his insurance from employer. They called for a \"peer to peer\" regarding this. I called the pt to see if this is something he wants. He called back and says yes he is interested in nutritional counseling for his prediabetes, weight and ankle pain due to weight. I returned the call to Dr. Eran Warren. She is unsure if a nutritional consult locally would both be covered . Her service is covered. She asked if there's anything we want her to focus on. I offered that she can send any correspondence to the pcp.

## 2019-01-17 NOTE — TELEPHONE ENCOUNTER
Spoke to pt, scheduled cpe. Insurance will pay for a M.D. Visit not R.D. For nutritional counseling, is on [employer] Here. Had questions about mri/contrast and hemachromatosis.

## 2019-01-24 ENCOUNTER — TELEPHONE (OUTPATIENT)
Dept: FAMILY MEDICINE CLINIC | Age: 59
End: 2019-01-24

## 2019-01-24 DIAGNOSIS — E78.00 PURE HYPERCHOLESTEROLEMIA: ICD-10-CM

## 2019-01-24 DIAGNOSIS — Z00.00 ROUTINE GENERAL MEDICAL EXAMINATION AT A HEALTH CARE FACILITY: Primary | ICD-10-CM

## 2019-01-24 DIAGNOSIS — R74.01 TRANSAMINITIS: ICD-10-CM

## 2019-01-24 NOTE — TELEPHONE ENCOUNTER
Pt came in for his physical this morning at 10:30Am and his appointment was at 11:00AM. He was upset that his labs were not in the system already because he was going to get labs done before his appointment. I did inform him that the lab is actually backed up and he would most likely be seen by Dr. Saray Villalba before he would be called back for the lab and we can see if Dr Saray Villalba can put in the orders at that time. He said he could not wait that long and he needed to eat now. He rescheduled his appointment for next week. He wants to make sure his lab orders are put in the system so he can come get them done before the appointment.

## 2019-01-31 ENCOUNTER — OFFICE VISIT (OUTPATIENT)
Dept: FAMILY MEDICINE CLINIC | Age: 59
End: 2019-01-31

## 2019-01-31 ENCOUNTER — HOSPITAL ENCOUNTER (OUTPATIENT)
Dept: LAB | Age: 59
Discharge: HOME | End: 2019-01-31
Payer: COMMERCIAL

## 2019-01-31 VITALS
SYSTOLIC BLOOD PRESSURE: 128 MMHG | TEMPERATURE: 98.1 F | HEART RATE: 60 BPM | RESPIRATION RATE: 18 BRPM | HEIGHT: 72 IN | OXYGEN SATURATION: 96 % | DIASTOLIC BLOOD PRESSURE: 82 MMHG | WEIGHT: 241 LBS | BODY MASS INDEX: 32.64 KG/M2

## 2019-01-31 DIAGNOSIS — E11.9 TYPE 2 DIABETES MELLITUS WITHOUT COMPLICATION, WITHOUT LONG-TERM CURRENT USE OF INSULIN (HCC): ICD-10-CM

## 2019-01-31 DIAGNOSIS — E83.110 HEREDITARY HEMOCHROMATOSIS (HCC): ICD-10-CM

## 2019-01-31 DIAGNOSIS — R93.1 AGATSTON CORONARY ARTERY CALCIUM SCORE GREATER THAN 400: ICD-10-CM

## 2019-01-31 DIAGNOSIS — Z00.00 ROUTINE GENERAL MEDICAL EXAMINATION AT A HEALTH CARE FACILITY: Primary | ICD-10-CM

## 2019-01-31 DIAGNOSIS — Z13.6 SCREENING, HEART DISEASE, ISCHEMIC: ICD-10-CM

## 2019-01-31 DIAGNOSIS — Z00.00 ROUTINE GENERAL MEDICAL EXAMINATION AT A HEALTH CARE FACILITY: ICD-10-CM

## 2019-01-31 DIAGNOSIS — R74.01 TRANSAMINITIS: ICD-10-CM

## 2019-01-31 DIAGNOSIS — Z82.49 FAMILY HISTORY OF EARLY CAD: ICD-10-CM

## 2019-01-31 DIAGNOSIS — E78.00 PURE HYPERCHOLESTEROLEMIA: ICD-10-CM

## 2019-01-31 DIAGNOSIS — M87.272: ICD-10-CM

## 2019-01-31 DIAGNOSIS — M25.572 CHRONIC PAIN OF LEFT ANKLE: ICD-10-CM

## 2019-01-31 DIAGNOSIS — G89.29 CHRONIC PAIN OF LEFT ANKLE: ICD-10-CM

## 2019-01-31 LAB
ALBUMIN SERPL-MCNC: 4.1 G/DL (ref 3.4–5)
ALBUMIN/GLOB SERPL: 1.1 {RATIO} (ref 0.8–1.7)
ALP SERPL-CCNC: 105 U/L (ref 45–117)
ALT SERPL-CCNC: 66 U/L (ref 16–61)
ANION GAP SERPL CALC-SCNC: 7 MMOL/L (ref 3–18)
AST SERPL-CCNC: 60 U/L (ref 15–37)
BILIRUB SERPL-MCNC: 0.7 MG/DL (ref 0.2–1)
BUN SERPL-MCNC: 14 MG/DL (ref 7–18)
BUN/CREAT SERPL: 13 (ref 12–20)
CALCIUM SERPL-MCNC: 9.5 MG/DL (ref 8.5–10.1)
CHLORIDE SERPL-SCNC: 105 MMOL/L (ref 100–108)
CHOLEST SERPL-MCNC: 214 MG/DL
CO2 SERPL-SCNC: 29 MMOL/L (ref 21–32)
CREAT SERPL-MCNC: 1.06 MG/DL (ref 0.6–1.3)
ERYTHROCYTE [DISTWIDTH] IN BLOOD BY AUTOMATED COUNT: 13.9 % (ref 11.6–14.5)
GLOBULIN SER CALC-MCNC: 3.6 G/DL (ref 2–4)
GLUCOSE SERPL-MCNC: 140 MG/DL (ref 74–99)
HCT VFR BLD AUTO: 42.9 % (ref 36–48)
HDLC SERPL-MCNC: 43 MG/DL (ref 40–60)
HDLC SERPL: 5 {RATIO} (ref 0–5)
HGB BLD-MCNC: 14.4 G/DL (ref 13–16)
LDLC SERPL CALC-MCNC: 127.2 MG/DL (ref 0–100)
LIPID PROFILE,FLP: ABNORMAL
MCH RBC QN AUTO: 31 PG (ref 24–34)
MCHC RBC AUTO-ENTMCNC: 33.6 G/DL (ref 31–37)
MCV RBC AUTO: 92.5 FL (ref 74–97)
PLATELET # BLD AUTO: 262 K/UL (ref 135–420)
PMV BLD AUTO: 9.8 FL (ref 9.2–11.8)
POTASSIUM SERPL-SCNC: 4.6 MMOL/L (ref 3.5–5.5)
PROT SERPL-MCNC: 7.7 G/DL (ref 6.4–8.2)
RBC # BLD AUTO: 4.64 M/UL (ref 4.7–5.5)
SODIUM SERPL-SCNC: 141 MMOL/L (ref 136–145)
TRIGL SERPL-MCNC: 219 MG/DL (ref ?–150)
VLDLC SERPL CALC-MCNC: 43.8 MG/DL
WBC # BLD AUTO: 5.6 K/UL (ref 4.6–13.2)

## 2019-01-31 PROCEDURE — 36415 COLL VENOUS BLD VENIPUNCTURE: CPT

## 2019-01-31 PROCEDURE — 85027 COMPLETE CBC AUTOMATED: CPT

## 2019-01-31 PROCEDURE — 80061 LIPID PANEL: CPT

## 2019-01-31 PROCEDURE — 80053 COMPREHEN METABOLIC PANEL: CPT

## 2019-01-31 NOTE — PROGRESS NOTES
Matt Lua is a 59 y.o. male (: 1960) presenting to address: Chief Complaint Patient presents with  Complete Physical  
 
 
Vitals:  
 19 0809 BP: 128/82 Pulse: 60 Resp: 18 Temp: 98.1 °F (36.7 °C) TempSrc: Oral  
SpO2: 96% Weight: 241 lb (109.3 kg) Height: 6' (1.829 m) PainSc:   5 PainLoc: Generalized Hearing/Vision:  
 
 Visual Acuity Screening Right eye Left eye Both eyes Without correction: 20/50 20/70 20/40 With correction:     
 
 
Learning Assessment:  
 
Learning Assessment 2/10/2015 PRIMARY LEARNER Patient HIGHEST LEVEL OF EDUCATION - PRIMARY LEARNER  > 4 YEARS OF COLLEGE  
BARRIERS PRIMARY LEARNER NONE  
CO-LEARNER CAREGIVER No  
PRIMARY LANGUAGE ENGLISH  
LEARNER PREFERENCE PRIMARY DEMONSTRATION  
  -  
ANSWERED BY patient RELATIONSHIP SELF Depression Screening: PHQ over the last two weeks 2019 Little interest or pleasure in doing things Not at all Feeling down, depressed, irritable, or hopeless Not at all Total Score PHQ 2 0 Fall Risk Assessment:  
 
Fall Risk Assessment, last 12 mths 2019 Able to walk? Yes Fall in past 12 months? No  
 
Abuse Screening:  
 
Abuse Screening Questionnaire 2019 Do you ever feel afraid of your partner? Ngozi Canal Are you in a relationship with someone who physically or mentally threatens you? Ngozi Canal Is it safe for you to go home? Shukri Brown Coordination of Care Questionaire: 1. Have you been to the ER, urgent care clinic since your last visit? Hospitalized since your last visit? NO 
 
2. Have you seen or consulted any other health care providers outside of the [address] [place] [address] since your last visit? Include any pap smears or colon screening. NO Advanced Directive: 1. Do you have an Advanced Directive? YES 
 
2. Would you like information on Advanced Directives?  NO

## 2019-01-31 NOTE — PATIENT INSTRUCTIONS

## 2019-01-31 NOTE — PROGRESS NOTES
Assessment/Plan: 1. Routine general medical examination at a health care facility 
- 
 
2. Type 2 diabetes mellitus without complication, without long-term current use of insulin (HCC)-A1c 8.0 
-pt wishes to work on wt loss/diet. F/u in 3mos - CT HEART W/O CONT WITH CALCIUM; Future - AMB POC EKG ROUTINE W/ 12 LEADS, INTER & REP 3. Post-traumatic osteonecrosis of left ankle (HCC) and chronic L ankle pain - MRI showed tendonitis in 2014 in addition to arthritis. Referral PT. 
- REFERRAL TO PHYSICAL THERAPY 4. Screening, heart disease, ischemic and family h/o early CAD- we discussed statin recommendation in setting of DM. Pt is hesitant given cirrhosis/transaminitis. - CT HEART W/O CONT WITH CALCIUM; Future - AMB POC EKG ROUTINE W/ 12 LEADS, INTER & REP 5. Hereditary hemochromatosis ([facility]- getting [facility]lebotomy [facility]. 15. -check echo given higher risk cardiomyopathy and known diastolic dysfunction 
- ECHO ADULT FOLLOW-UP OR LIMITED; Future The plan was discussed with the patient. The patient verbalized understanding and is in agreement with the plan. All medication potential side effects were discussed with the patient. Health Maintenance:  
Health Maintenance Topic Date Due  
 FOOT EXAM Q1  02/27/2019  Shingrix Vaccine Age 50> (2 of 2) 02/25/2019  
 HEMOGLOBIN A1C Q6M  06/30/2019  MICROALBUMIN Q1  08/03/2019  LIPID PANEL Q1  08/29/2019  
 EYE EXAM RETINAL OR DILATED  08/07/2020  COLONOSCOPY  01/28/2021  
 DTaP/Tdap/Td series (2 - Td) 12/04/2023  Hepatitis C Screening  Completed  Pneumococcal 19-64 Medium Risk  Completed  Influenza Age 5 to Adult  Completed Robb Pretty is a 59 y.o. male and presents with Complete Physical 
  
Subjective: 
Here for physical. 
DM2 - A1c 8.0. Just started nutrisystem diet. He's not checking his sugars. He wants to work on diet and wt lss.  
 
He was under the impression he would be getting a stress test today as part of the physical.  No CP. He is concerned given his father had MI age 61. Obesity - he's quit drinking etoh. Trying to work on wt loss. He c/o ongoing L ankle pain. Has seen 2 ortho docs. MRI 2014 showed tendonitis. ROS: 
Constitutional: No recent weight change. No weakness/fatigue. No f/c. Skin: No rashes, change in nails/hair, itching HENT: No HA, dizziness. No hearing loss/tinnitus. No nasal congestion/discharge. Eyes: No change in vision, double/blurred vision or eye pain/redness. Cardiovascular: No CP/palpitations. No CARDOZA/orthopnea/PND. Respiratory: No cough/sputum, dyspnea, wheezing. Gastointestinal: No dysphagia, reflux. No n/v. No constipation/diarrhea. No melena/rectal bleeding. Genitourinary: No dysuria, urinary hesitancy, nocturia, hematuria. No incontinence. Musculoskeletal: No joint pain/stiffness. No muscle pain/tenderness. Endo: No heat/cold intolerance, no polyuria/polydypsia. Heme: No h/o anemia. No easy bleeding/bruising. Allergy/Immunology: No seasonal rhinitis. Denies frequent colds, sinus/ear infections. Neurological: No seizures/numbness/weakness. No paresthesias. Psychiatric:  No depression, anxiety. The problem list was updated as a part of today's visit. Patient Active Problem List  
Diagnosis Code  Transaminitis R74.0  Prehypertension R03.0  Bilateral wrist pain M25.531, M25.532  
 History of carpal tunnel release Z98.890  
 Exposure to hepatitis B Z20.5  Post-traumatic osteonecrosis of left ankle ([address] V49.520  HLD (hyperlipidemia) E78.5  Hemochromatosis G06.327  
 Mild diastolic dysfunction Z33.2  Benign prostatic hyperplasia with lower urinary tract symptoms N40.1  Hypogonadism in male E29.1  Fibrosis of liver K74.0  Type 2 diabetes mellitus without complication, without long-term current use of insulin (HCC) E11.9  Liver lesion, right lobe K76.9 The PSH, FH were reviewed.   
 SH: 
 Social History Tobacco Use  Smoking status: Former Smoker Packs/day: 0.50 Years: 45.00 Pack years: 22.50 Types: Cigarettes Last attempt to quit: 2016 Years since quittin.7  Smokeless tobacco: Never Used Substance Use Topics  Alcohol use: Yes Comment: very little  Drug use: No  
 
 
Medications/Allergies: 
Current Outpatient Medications on File Prior to Visit Medication Sig Dispense Refill  metFORMIN ER (GLUCOPHAGE XR) 750 mg tablet Take 1 Tab by mouth daily (with dinner). 90 Tab 0  
 glimepiride (AMARYL) 2 mg tablet TAKE 1 TABLET BY MOUTH ONCE DAILY IN THE MORNING 90 Tab 0  
 varenicline (CHANTIX CONTINUING MONTH BOX) 1 mg tablet Take 1 Tab by mouth two (2) times a day. 180 Tab 0  
 diclofenac EC (VOLTAREN) 75 mg EC tablet TAKE ONE TABLET BY MOUTH TWICE DAILY AS NEEDED 60 Tab 11  
 sildenafil citrate (VIAGRA) 50 mg tablet Take 1 Tab by mouth daily as needed. 9 Tab 11  
 lancets (ONETOUCH DELICA LANCETS) 30 gauge misc Test bs daily E11.9 100 Lancet 3  
 glucose blood VI test strips (ONETOUCH ULTRA TEST) strip Test bs daily. E11.9 100 Strip 3 No current facility-administered medications on file prior to visit. Allergies Allergen Reactions  Codeine Itching Objective: 
Visit Vitals /82 (BP 1 Location: Right arm, BP Patient Position: Sitting) Pulse 60 Temp 98.1 °F (36.7 °C) (Oral) Resp 18 Ht 6' (1.829 m) Wt 241 lb (109.3 kg) SpO2 96% BMI 32.69 kg/m² Constitutional: Well developed, nourished, no distress, alert, obese habitus HENT: Exterior ears and tympanic membranes normal bilaterally. Supple neck. No thyromegaly or lymphadenopathy. Oropharynx clear and moist mucous membranes. Normal inferior turbinates. Septum midline. Eyes: Conjunctiva normal. PERRL. Eyelids normal.  
CV: S1, S2.  RRR. No murmurs/rubs. No thrills palpated. No carotid bruits. Intact distal pulses. No edema. No aortic bruits. Pulm: No abnormalities on inspection. Clear to auscultation bilaterally. No wheezing/rhonchi. Normal effort. GI: Soft, nontender, nondistended. Normal active bowel sounds. MS: Gait normal.  Joints without deformity. Neuro: A/O x 3. No focal motor or sensory deficits. Speech normal.  
Skin: No lesions/rashes on inspection. Psych: Appropriate affect, judgement and insight. Short-term memory intact. Monofilament nml EKG: NSR, ko , no ST changes

## 2019-02-04 DIAGNOSIS — E11.9 TYPE 2 DIABETES MELLITUS WITHOUT COMPLICATION, WITHOUT LONG-TERM CURRENT USE OF INSULIN (HCC): ICD-10-CM

## 2019-02-04 RX ORDER — GLIMEPIRIDE 2 MG/1
TABLET ORAL
Qty: 90 TAB | Refills: 0 | Status: SHIPPED | OUTPATIENT
Start: 2019-02-04 | End: 2019-05-01 | Stop reason: SDUPTHER

## 2019-02-11 ENCOUNTER — HOSPITAL ENCOUNTER (OUTPATIENT)
Dept: PHYSICAL THERAPY | Age: 59
Discharge: HOME | End: 2019-02-11
Payer: COMMERCIAL

## 2019-02-11 PROCEDURE — 97110 THERAPEUTIC EXERCISES: CPT

## 2019-02-11 PROCEDURE — 97162 PT EVAL MOD COMPLEX 30 MIN: CPT

## 2019-02-11 NOTE — PROGRESS NOTES
PHYSICAL THERAPY - DAILY TREATMENT NOTE Patient Name: Lula Negron        Date: 2019 : 1960   yes Patient  Verified Visit #:     Insurance: Payor: BLUE CROSS / Plan: [address] [place] [address] / Product Type: PPO / In time: 5:00 Out time: 6:00 Total Treatment Time: 60 Medicare/BCBS Time Tracking (below) Total Timed Codes (min):  8 1:1 Treatment Time:  60 TREATMENT AREA =  Left ankle pain [M25.572] SUBJECTIVE Pain Level (on 0 to 10 scale):  0  / 10 Medication Changes/New allergies or changes in medical history, any new surgeries or procedures?    no  If yes, update Summary List  
Subjective Functional Status/Changes:  []  No changes reported See POC OBJECTIVE 8 min Therapeutic Exercise:  [x]  See flow sheet Rationale:      increase ROM, increase strength, improve coordination, improve balance and increase proprioception to improve the patients ability to ambulate Billed With/As: 
 [x] TE 
 [] TA 
 [] Neuro 
 [] Self Care Patient Education: [x] Review HEP [] Progressed/Changed HEP based on:  
[] positioning   [] body mechanics   [] transfers   [] heat/ice application   
[] other:   
Other Objective/Functional Measures: 
 
See POC Post Treatment Pain Level (on 0 to 10) scale:   0  / 10 ASSESSMENT Assessment/Changes in Function:  
 
See POC []  See Progress Note/Recertification Patient will continue to benefit from skilled PT services to modify and progress therapeutic interventions, address functional mobility deficits, address ROM deficits, address strength deficits, analyze and address soft tissue restrictions, analyze and cue movement patterns, analyze and modify body mechanics/ergonomics, assess and modify postural abnormalities, address imbalance/dizziness and instruct in home and community integration to attain remaining goals. Progress toward goals / Updated goals: 
See POC PLAN 
 []  Upgrade activities as tolerated yes Continue plan of care  
[]  Discharge due to :   
[]  Other:   
 
Therapist: Alissa Figueredo, PT Date: 2/11/2019 Time: 6:10 PM  
 
Future Appointments Date Time Provider Marcio Aquino 2/14/2019  4:30 PM Adalberto Crow [facility]  
2/19/2019  4:30 PM [facility]  
2/21/2019  4:30 PM [facility] [address] [place] [address]  
2/26/2019  4:30 PM [facility] [address] [place] [address]  
2/28/2019  4:30 PM Marilou Crow [address]  
3/5/2019  5:00 PM Teresa Haider [address] [place] [address]  
3/7/2019  5:00 PM [facility]  
5/1/2019 11:15 AM Luigi Pearce MD [facility] [address]

## 2019-02-11 NOTE — PROGRESS NOTES
[address] PHYSICAL THERAPY 
[address] [place] [address] [address],Roya Smith, [address] [facility] [address] - Phone: [phone]  Fax: [phone] PLAN OF CARE / STATEMENT OF MEDICAL NECESSITY FOR PHYSICAL THERAPY SERVICES Patient Name: Shaheed Simpson : 1960 Medical  
Diagnosis: Chronic pain of L ankle [M25.572, G89.29] Post-traumatic osteonecrosis of L ankle [M87.272] Treatment Diagnosis: Left ankle pain [M25.572] Onset Date: Chronic, initial injury 63 Referral Source: Funmilayo Gilliland MD Start of Care [facility]): 2019 Prior Hospitalization: See medical history Provider #: [id number] Prior Level of Function: Able to stand, walk for longer periods of time before onset of pain Comorbidities: L CAROLE 2016, R hip ORIF , R CAROLE 2004 w/ revision , L ankle fx , DM, OA, osteoporosis, hereditary hemochromatosis Medications: Verified on Patient Summary List  
The Plan of Care and following information is based on the information from the initial evaluation.  
=========================================================================================== Assessment / key information:  Pt is a 58 y/o M who presents to PT w/ c/o chronic L ankle pain that initially began in  after stepping off a curb and fx the L ankle in  while in [employer]. Pt reports chronic pain since that time which has progressively worsened over the last several years. Rates his pain from 3-9/10, localizes by pointing along the 2nd ray, dorsal aspect. Pt notes inc pain w/ prolonged walking, worse on unlevel surfaces. Pt notes constant swelling and sensation of instability. Reports he was rx ankle brace x 6 months ago but did not fulfill rx. Pt reports he has been told by 2 separate orthopedic surgeons that his pain is related to arthritis, however pt stats he does not agree with this diagnosis, despite multiple x-rays and MRI confirming.  Pain is made better elevating his foot above his heart and w/ pain medication. Denies red flags. Visual inspection shows mild calcaneal valgus, navicular drop L>R, midfoot collapse in mid-stance/SLS, and mild out-toeing. Pt is unable to perform full range heel raise on the L 2' pain. SLS shows marked ankle strategy B, limited to < 5 sec before LOB. Pt able to squat to 30 deg prior to onset of significant pain anterior talofibular joint. Ankle AROM DF L 0 deg p!, R 15 deg, PF L 40, R 46, IV L 30, R 33, EV L 18 p!, R 20. Gastroc flexibility L = -5 p!, R 0 deg. P! Reported to resisted plantar flexion, eversion, and DF L. Significant hypomobility noted to posterior glide TCJ, lateral subtalar glide, and poor intertarsal mobility. TTP over L ATFL, deltoid ligament, TrP evident in the L peroneals, posterior tib, and medial gastroc. (-)anterior drawer, talar tilt. +2 cm effusion noted to L ankle vs R. FOTO score 28/100. Pt sx consistent w/ L ankle DJD. Pt educated on dx, anatomical considerations, prognosis, POC, and HEP. Recommended semi-custom orthotic fabrication but pt declined, stating he would purchase OTC orthotics. Pt could benefit from PT to address impairments and functional limitations to enable pt ability to ambulate further distances. Pt's attitude towards PT and disagreement w/ dx renders a fair dx for PT. 
=========================================================================================== Eval Complexity: History MEDIUM  Complexity : 1-2 comorbidities / personal factors will impact the outcome/ POC ;  Examination  MEDIUM Complexity : 3 Standardized tests and measures addressing body structure, function, activity limitation and / or participation in recreation ; Presentation MEDIUM Complexity : Evolving with changing characteristics ; Decision Making MEDIUM Complexity : FOTO score of 26-74; Overall Complexity MEDIUM Problem List: pain affecting function, decrease ROM, decrease strength, edema affecting function, impaired gait/ balance, decrease ADL/ functional abilitiies, decrease activity tolerance, decrease flexibility/ joint mobility and decrease transfer abilities Treatment Plan may include any combination of the following: Therapeutic exercise, Therapeutic activities, Neuromuscular re-education, Physical agent/modality, Gait/balance training, Manual therapy, Patient education, Self Care training, Functional mobility training, Home safety training and Stair training Patient / Family readiness to learn indicated by: asking questions, trying to perform skills and interest 
Persons(s) to be included in education: patient (P) Barriers to Learning/Limitations: None Measures taken, if barriers to learning:   
Patient Goal (s): \"be able to walk and use my treadmill to lose some weight; walk without pain\" Patient self reported health status: good Rehabilitation Potential: fair ? Short Term Goals: To be accomplished in  2  weeks: 1. Pt will be I and compliant with HEP For self management of sx 2. Pt will obtain OTC orthotics to accommodate for hind/midfoot deformity to improve gait mechanics and load distribution ? Long Term Goals: To be accomplished in  6  weeks: 1. Improve L ankle DF AROM by at least 10 deg to improve ability to descend stairs, squat 2. Pt will maintain SLS x 20\" on Airex pad to improve ankle stability ambulating on unlevel surfaces 3. Improve FOTO score to >/= 47/100 to indicate improved function Frequency / Duration:   Patient to be seen  2  times per week for 6  weeks: 
Patient / Caregiver education and instruction: exercises Therapist Signature: [place] PT Date: 2/11/2019 Certification Period: na Time: 6:11 PM  
=========================================================================================== I certify that the above Physical Therapy Services are being furnished while the patient is under my care. I agree with the treatment plan and certify that this therapy is necessary. Physician Signature:       Date:      Time:  Please sign and return to [facility]. or you may fax the signed copy to [phone]. Thank you.

## 2019-02-14 ENCOUNTER — HOSPITAL ENCOUNTER (OUTPATIENT)
Dept: PHYSICAL THERAPY | Age: 59
Discharge: HOME | End: 2019-02-14
Payer: COMMERCIAL

## 2019-02-14 PROCEDURE — 97110 THERAPEUTIC EXERCISES: CPT

## 2019-02-14 PROCEDURE — 97140 MANUAL THERAPY 1/> REGIONS: CPT

## 2019-02-14 NOTE — PROGRESS NOTES
PHYSICAL THERAPY - DAILY TREATMENT NOTE Patient Name: Winifred Burks        Date: 2019 : 1960   yes Patient  Verified Visit #:     Insurance: Payor: BLUE CROSS / Plan: [address] [place] [address] / Product Type: PPO / In time: 4:25 PM Out time: 5:31 PM  
Total Treatment Time: 77 Medicare/[facility] Time Tracking (below) Total Timed Codes (min):  56 1:1 Treatment Time:  30 TREATMENT AREA =  Left ankle pain [M25.572] SUBJECTIVE Pain Level (on 0 to 10 scale): 8-9   10 Medication Changes/New allergies or changes in medical history, any new surgeries or procedures?    no  If yes, update Summary List  
Subjective Functional Status/Changes:  []  No changes reported \"I've got a whole lot of pain today in my L foot and ankle from sitting too much. It feels very stiff from sitting too long. \" OBJECTIVE Modalities Rationale:     decrease inflammation and decrease pain to improve patient's ability to perform functional ADLs 
 min [] Estim, type/location:   
                                 []  att     []  unatt     []  w/US     []  w/ice    []  w/heat 
 min []  Mechanical Traction: type/lbs   
               []  pro   []  sup   []  int   []  cont    []  before manual    []  after manual  
 min []  Ultrasound, settings/location:    
 min []  Iontophoresis w/ dexamethasone, location:   
                                           []  take home patch       []  in clinic  
10 min [x]  Ice     []  Heat    location/position: To L ankle and gastroc in semi-recline post-session  
 min []  Vasopneumatic Device, press/temp:   
 min []  Other:   
[] Skin assessment post-treatment (if applicable):   
[]  intact    []  redness- no adverse reaction    
[]redness  adverse reaction:     
15/41 min Therapeutic Exercise:  [x]  See flow sheet Rationale:      increase ROM, increase strength, improve coordination, improve balance and increase proprioception to improve the patients ability to ambulate 15 min Manual Therapy: DTM/TPR to (L) gastroc, peroneals, post tib; post TCJ mob and lateral STJ mob; manual (L) gastroc stretch Rationale:      increase ROM and increase tissue extensibility to improve patient's ability to regain full L ankle functional mobility to return to recreational walking. Billed With/As: 
 [x] TE 
 [] TA 
 [] Neuro 
 [] Self Care Patient Education: [x] Review HEP [] Progressed/Changed HEP based on:  
[] positioning   [] body mechanics   [] transfers   [] heat/ice application   
[] other:   
Other Objective/Functional Measures: 
 
1:1 TE + MT = 30' Inc tenderness and trigger points along medial gastroc Initiated therex Post Treatment Pain Level (on 0 to 10) scale:   3-4  / 10 ASSESSMENT Assessment/Changes in Function:  
 
Challenged with primarily utilizing ankle joint for BAPs board and TB EV; req'd tactile cues to avoid knee assistance. Able to reduce subjective pain following PT session denoting good tolerance with current PT interventions 
  
[]  See Progress Note/Recertification Patient will continue to benefit from skilled PT services to modify and progress therapeutic interventions, address functional mobility deficits, address ROM deficits, address strength deficits, analyze and address soft tissue restrictions, analyze and cue movement patterns, analyze and modify body mechanics/ergonomics, assess and modify postural abnormalities and instruct in home and community integration to attain remaining goals. Progress toward goals / Updated goals: 
Reports compliance with current HEP (progressing towards STG #1) PLAN 
[]  Upgrade activities as tolerated yes Continue plan of care  
[]  Discharge due to :   
[]  Other:   
 
Therapist: ANGIE Dee Date: 2/14/2019 Time: 7:03 PM  
 
Future Appointments Date Time Provider Marcio Aquino 2/14/2019  4:30 PM Huy Traylor 2/19/2019  4:30 PM Zach [facility]  
2/21/2019  4:30 PM Ankit Crowing [facility]  
2/26/2019  4:30 PM [facility]  
2/28/2019  4:30 [facility]  
3/5/2019  5:00 PM Nathanael Lazo [facility]  
3/7/2019  5:00 PM [facility]  
5/1/2019 11:15 AM Julio Cesar Mera MD [facility]

## 2019-02-15 PROBLEM — R93.1 AGATSTON CORONARY ARTERY CALCIUM SCORE GREATER THAN 400: Status: ACTIVE | Noted: 2019-02-15

## 2019-02-15 RX ORDER — ATORVASTATIN CALCIUM 40 MG/1
40 TABLET, FILM COATED ORAL DAILY
Qty: 90 TAB | Refills: 1 | Status: SHIPPED | OUTPATIENT
Start: 2019-02-15 | End: 2019-10-23 | Stop reason: SDUPTHER

## 2019-02-19 ENCOUNTER — HOSPITAL ENCOUNTER (OUTPATIENT)
Dept: PHYSICAL THERAPY | Age: 59
Discharge: HOME | End: 2019-02-19
Payer: COMMERCIAL

## 2019-02-19 ENCOUNTER — OFFICE VISIT (OUTPATIENT)
Dept: CARDIOLOGY CLINIC | Age: 59
End: 2019-02-19

## 2019-02-19 VITALS
BODY MASS INDEX: 33.05 KG/M2 | WEIGHT: 244 LBS | HEART RATE: 88 BPM | DIASTOLIC BLOOD PRESSURE: 78 MMHG | OXYGEN SATURATION: 97 % | SYSTOLIC BLOOD PRESSURE: 125 MMHG | HEIGHT: 72 IN

## 2019-02-19 DIAGNOSIS — R53.83 FATIGUE, UNSPECIFIED TYPE: Primary | ICD-10-CM

## 2019-02-19 DIAGNOSIS — R93.1 ABNORMAL SCREENING CARDIAC CT: ICD-10-CM

## 2019-02-19 DIAGNOSIS — R06.00 DYSPNEA, UNSPECIFIED TYPE: ICD-10-CM

## 2019-02-19 PROCEDURE — 97110 THERAPEUTIC EXERCISES: CPT

## 2019-02-19 PROCEDURE — 97140 MANUAL THERAPY 1/> REGIONS: CPT

## 2019-02-19 RX ORDER — ASPIRIN 81 MG/1
81 TABLET ORAL DAILY
Qty: 30 TAB | Refills: 6 | Status: SHIPPED | OUTPATIENT
Start: 2019-02-19 | End: 2019-08-08 | Stop reason: SDUPTHER

## 2019-02-19 RX ORDER — METOPROLOL SUCCINATE 25 MG/1
25 TABLET, EXTENDED RELEASE ORAL DAILY
Qty: 30 TAB | Refills: 6 | Status: SHIPPED | OUTPATIENT
Start: 2019-02-19 | End: 2019-10-23 | Stop reason: ALTCHOICE

## 2019-02-19 NOTE — PATIENT INSTRUCTIONS
Start Aspirin 81mg Daily   Toprol XL 12.5mg Daily     Leandro Hopes will call to schedule your testing within 24-48 hours. If you do not hear from her, then please call her directly at [phone].     All testing/lab work is completed in [address] [place] [address]   at [facility]

## 2019-02-19 NOTE — PROGRESS NOTES
PHYSICAL THERAPY - DAILY TREATMENT NOTE Patient Name: Matt Sample        Date: 2019 : 1960   yes Patient  Verified Visit #:   3   of     Insurance: Payor: BLUE CROSS / Plan: [address] [place] [address] / Product Type: PPO / In time: 4:15 PM Out time: 5:19 PM  
Total Treatment Time: 59 Medicare/Need Time Tracking (below) Total Timed Codes (min):  54 1:1 Treatment Time:  44 TREATMENT AREA =  Left ankle pain [M25.572] SUBJECTIVE Pain Level (on 0 to 10 scale): 5 / 10 Medication Changes/New allergies or changes in medical history, any new surgeries or procedures?    no  If yes, update Summary List  
Subjective Functional Status/Changes:  []  No changes reported Pt reports wearing OTC inserts and has yet to determine whether or not it's helping his ankle pain. Pt also reports purchasing incline board to utilize at work. OBJECTIVE Modalities Rationale:     decrease inflammation and decrease pain to improve patient's ability to perform functional ADLs 
 min [] Estim, type/location:   
                                 []  att     []  unatt     []  w/US     []  w/ice    []  w/heat 
 min []  Mechanical Traction: type/lbs   
               []  pro   []  sup   []  int   []  cont    []  before manual    []  after manual  
 min []  Ultrasound, settings/location:    
 min []  Iontophoresis w/ dexamethasone, location:   
                                           []  take home patch       []  in clinic  
10 min [x]  Ice     []  Heat    location/position: cryoboot to L ankle and gastroc in semi-recline post-session  
 min []  Vasopneumatic Device, press/temp:   
 min []  Other:   
[] Skin assessment post-treatment (if applicable):   
[]  intact    []  redness- no adverse reaction    
[]redness  adverse reaction:     
29/39 min Therapeutic Exercise:  [x]  See flow sheet Rationale:      increase ROM, increase strength, improve coordination, improve balance and increase proprioception to improve the patients ability to ambulate 15 min Manual Therapy: DTM/TPR to (L) gastroc, peroneals, post tib; post TCJ mob and lateral STJ mob; manual (L) gastroc stretch Rationale:      increase ROM and increase tissue extensibility to improve patient's ability to regain full L ankle functional mobility to return to recreational walking. Billed With/As: 
 [x] TE 
 [] TA 
 [] Neuro 
 [] Self Care Patient Education: [x] Review HEP [] Progressed/Changed HEP based on:  
[] positioning   [] body mechanics   [] transfers   [] heat/ice application   
[] other:   
Other Objective/Functional Measures: 
 
1:1 TE + MT = 44' Cont to present with increase tenderness and trigger points along medial gastroc; cont to present with decrease TCJ and STJ mobility. TE per flowsheet Post Treatment Pain Level (on 0 to 10) scale:  4  / 10 ASSESSMENT Assessment/Changes in Function:  
 
Demonstrated improved DF and increase heel-toe gait pattern following manual therapy, however continues to present with antalgic gait during ambulation. Educated pt on easing in wearing OTC orthotics to avoid increase pain; pt responded with good understanding. []  See Progress Note/Recertification Patient will continue to benefit from skilled PT services to modify and progress therapeutic interventions, address functional mobility deficits, address ROM deficits, address strength deficits, analyze and address soft tissue restrictions, analyze and cue movement patterns, analyze and modify body mechanics/ergonomics, assess and modify postural abnormalities and instruct in home and community integration to attain remaining goals. Progress toward goals / Updated goals: 
Reports good compliance with current HEP (met STG #1) Reports purchasing OTC orthotics to improve L ankle mobility and decrease pain (met STG #2) Begin progressing towards LTGs PLAN 
 []  Upgrade activities as tolerated yes Continue plan of care  
[]  Discharge due to :   
[]  Other:   
 
Therapist: ANGIE Rodriguez Date: 2/19/2019 Time: 6:37 PM  
 
Future Appointments Date Time Provider Marcio Aquino 2/19/2019  4:30 PM [facility]  
2[facility]21[facility]2019  [facility]  
2/26/2019  [facility]  
2/28/2019  [facility]  
3/5/2019  5:00 PM [facility]  
3/7/2019  5:00 PM [facility]  
3/14/2019 11:15 AM Isidro Reyna MD [address] [place] [address]  
5/1/2019 11:15 AM Yung Santillan MD [facility]

## 2019-02-19 NOTE — PROGRESS NOTES
[facility]    Mr. Jamie Thakkar is 59 y.o. male with history of diabetes, hyperlipidemia. Patient was asked to come see me for cardiac evaluation. Patient denies any history of myocardial infarction and congestive heart failure. He denies any history of atrial fibrillation. Patient underwent coronary calcium scoring and it was markedly abnormal so he was asked to come see me. Patient tells me that he does not have any chest pain or chest tightness. His main symptoms is mild dyspnea and fatigue over last 1-2 years. Denies PND, LE edema. No presyncope or syncope. Denies any nausea, vomiting, abdominal pain, fever, chills, sputum production. No hematuria or other bleeding complaints    Past Medical History:   Diagnosis Date    Contact dermatitis and other eczema, due to unspecified cause     skin cancer    Diabetes ([facility] [address]     Hepatitis B 12/4/2013    Hepatomegaly 12/16/2013    HLD (hyperlipidemia)     Tobacco abuse, in remission          Past Surgical History:   Procedure Laterality Date    HX CARPAL TUNNEL RELEASE      HX COLONOSCOPY  2/8/2016    Repeat colonoscopy in 5 yrs per Dr. Noreen Hdz      right hip x 2    HX ORTHOPAEDIC      bilateral elbows/wrist       Current Outpatient Medications   Medication Sig    atorvastatin (LIPITOR) 40 mg tablet Take 1 Tab by mouth daily.  glimepiride (AMARYL) 2 mg tablet TAKE 1 TABLET BY MOUTH ONCE DAILY IN THE MORNING    metFORMIN ER (GLUCOPHAGE XR) 750 mg tablet Take 1 Tab by mouth daily (with dinner).  diclofenac EC (VOLTAREN) 75 mg EC tablet TAKE ONE TABLET BY MOUTH TWICE DAILY AS NEEDED    sildenafil citrate (VIAGRA) 50 mg tablet Take 1 Tab by mouth daily as needed.  lancets (ONETOUCH DELICA LANCETS) 30 gauge misc Test bs daily E11.9    glucose blood VI test strips (ONETOUCH ULTRA TEST) strip Test bs daily.   E11.9     No current facility-administered medications for this visit. Allergies and Sensitivities:  Allergies   Allergen Reactions    Codeine Itching       Family History:  Family History   Problem Relation Age of Onset    Alcohol abuse Mother     Suicide Mother     Heart Attack Father        Social History:  Social History     Tobacco Use    Smoking status: Former Smoker     Packs/day: 0.50     Years: 45.00     Pack years: 22.50     Types: Cigarettes     Last attempt to quit: 2016     Years since quittin.8    Smokeless tobacco: Never Used   Substance Use Topics    Alcohol use: Yes     Comment: very little    Drug use: No     He  reports that he quit smoking about 2 years ago. His smoking use included cigarettes. He has a 22.50 pack-year smoking history. he has never used smokeless tobacco.  He  reports that he drinks alcohol. Review of Systems:  Cardiac symptoms as noted above in HPI. All others negative. Denies fatigue, malaise, skin rash, joint pain, blurring vision, photophobia, neck pain, hemoptysis, chronic cough, nausea, vomiting, hematuria, burning micturition, BRBPR, chronic headaches. Physical Exam:  BP Readings from Last 3 Encounters:   19 125/78   19 128/82   18 132/78         Pulse Readings from Last 3 Encounters:   19 88   19 60   18 64          Wt Readings from Last 3 Encounters:   19 244 lb (110.7 kg)   19 241 lb (109.3 kg)   18 237 lb (107.5 kg)       Constitutional: Oriented to person, place, and time. HENT: Head: Normocephalic and atraumatic. Neck: No JVD present. Carotid bruit is not appreciated. Cardiovascular: Regular rhythm. No murmur, gallop or rubs appreciated  Lung: Breath sounds normal. No respiratory distress. No ronchi or rales appreciated  Abdominal: No tenderness. No rebound and no guarding. Musculoskeletal: There is no lower extremity edema.  No cynosis  Lymphadenopathy:  No cervical or supraclavicular adenopathy appriciated. Neurological: No gross motor deficit noted. Skin: No visible skin rash noted. No Ear discharge noted  Psychiatric: Normal mood and affect. Good distal pulse      Review of Data  LABS:   Lab Results   Component Value Date/Time    Sodium 141 01/31/2019 07:40 AM    Potassium 4.6 01/31/2019 07:40 AM    Chloride 105 01/31/2019 07:40 AM    CO2 29 01/31/2019 07:40 AM    Glucose 140 (H) 01/31/2019 07:40 AM    BUN 14 01/31/2019 07:40 AM    Creatinine 1.06 01/31/2019 07:40 AM     Lipids Latest Ref Rng & Units 1/31/2019 8/29/2018 9/11/2017 10/6/2016 1/21/2016   Chol, Total <200 MG/(H) 211(H) 206(H) 207(H) 234(H)   HDL 40 - 60 MG/DL 43 36(L) 42 44 27(L)   LDL 0 - 100 MG/. 2(H) 100. 8(H) 100. 8(H) 102. 8(H) LDL AND VLDL CHOLESTEROL NOT CALCULATED WHEN TRIGLYCERIDES >400 MG/DL OR HDL CHOLESTEROL <20 MG/DL   Trig <150 MG/(H) 371(H) 316(H) 301(H) 559(H)   Chol/HDL Ratio 0 - 5.0   5.0 5.9(H) 4.9 4.7 8.7(H)   Some recent data might be hidden     Lab Results   Component Value Date/Time    ALT (SGPT) 66 (H) 01/31/2019 07:40 AM     Lab Results   Component Value Date/Time    Hemoglobin A1c 7.6 (H) 09/11/2017 07:08 AM    Hemoglobin A1c (POC) 8.0 12/31/2018 11:28 AM       EKG  (01/19) sinus bradycardia. Otherwise normal EKG. ECHO    STRESS TEST (EST, PHARM, NUC, ECHO etc)    CATHETERIZATION    Calcium score: (02/19)  1. Severe coronary artery calcification. Agatston calcium score of 554. The observed calcium score of 554 is at percentile 80 when matched for subjects of same age, gender and race/ ethnicity. 2. No mitral or aortic annular calcification. 3. Mild ectasia of the aortic root (maximum diameter 4.3 cm) and ascending (maximum diameter 3.8 cm). 4. Non cardiac findings reported separately by Radiologist (copied below). Segmental Artery scores: Normal origin and course of coronary arteries.    Left main coronary artery: [0]   Left anterior descending artery: [93.47]   Left circumflex artery: [20.62]   Right coronary artery: [439.57]     IMPRESSION & PLAN:  Mr. Luisito Lord is a 70-year-old male with a history of diabetes and hyperlipidemia. Abnormal calcium scoring  Patient had a calcium scoring of the coronary artery. This was elevated at 554. This score is at 80 percentile when managed to same age gender and ethnicity. Based on report, majority of the calcium deposit is on right coronary artery. His main symptoms at this time is dyspnea and fatigue. He does not report any chest pain chest tightness. Today we discussed further management strategy including aggressive risk factor modification including aggressive management of diabetes and hyperlipidemia. He has been prescribed atorvastatin he has not started taking it and have asked him to start taking it today. Goal hemoglobin C less than 7 is recommended from cardiac score standpoint. I am asking him to start taking aspirin 81 mg daily. We discussed further management strategy with either noninvasive or invasive risk stratification. After lengthy discussion, he has decided to pursue noninvasive risk stratification for physiologic evaluation of coronary artery with stress test.  We will make further recommendation based on stress test finding. I will order echocardiogram because of these symptoms and abnormal calcium scoring    Hyperlipidemia:  He is on atorvastatin 40 mg daily and I have asked him to start taking it today. He has not filled that prescription yet. Diabetes:  Goal hemoglobin C less than 7 is recommended from cardiology standpoint. Last chemo globin A1c was 8.0. Aggressive management of diabetes is recommended based on coronary calcium scoring    This plan was discussed with patient who is in agreement. Thank you for allowing me to participate in patient care. Please feel free to call me if you have any question or concern. Estrella Wiley MD  Please note:  This document has been produced using voice recognition software. Unrecognized errors in transcription may be present.

## 2019-02-19 NOTE — PROGRESS NOTES
1. Have you been to the ER, urgent care clinic since your last visit? Hospitalized since your last visit? No    2. Have you seen or consulted any other health care providers outside of the [address] [place] [address] since your last visit? Include any pap smears or colon screening.  No

## 2019-02-21 ENCOUNTER — HOSPITAL ENCOUNTER (OUTPATIENT)
Dept: PHYSICAL THERAPY | Age: 59
Discharge: HOME | End: 2019-02-21
Payer: COMMERCIAL

## 2019-02-21 PROCEDURE — 97140 MANUAL THERAPY 1/> REGIONS: CPT

## 2019-02-21 PROCEDURE — 97110 THERAPEUTIC EXERCISES: CPT

## 2019-02-21 NOTE — PROGRESS NOTES
PHYSICAL THERAPY - DAILY TREATMENT NOTE Patient Name: Yony Jeter        Date: 2019 : 1960   yes Patient  Verified Visit #:     Insurance: Payor: BLUE CROSS / Plan: [address] [place] [address] / Product Type: PPO / In time: 4:25 PM Out time: 5:44 PM  
Total Treatment Time: 78 Medicare/BCBS Time Tracking (below) Total Timed Codes (min):  69 1:1 Treatment Time:  50 TREATMENT AREA =  Left ankle pain [M25.572] SUBJECTIVE Pain Level (on 0 to 10 scale): 9 / 10 Medication Changes/New allergies or changes in medical history, any new surgeries or procedures?    no  If yes, update Summary List  
Subjective Functional Status/Changes:  []  No changes reported Pt reports increase L ankle pain and pain in between 1st and 2nd toe after LV. States that he also notices that there's more \"popping\" in the joint since LV and has avoidining performing HEP stretches due to pain OBJECTIVE Modalities Rationale:     decrease inflammation and decrease pain to improve patient's ability to perform functional ADLs 
 min [] Estim, type/location:   
                                 []  att     []  unatt     []  w/US     []  w/ice    []  w/heat 
 min []  Mechanical Traction: type/lbs   
               []  pro   []  sup   []  int   []  cont    []  before manual    []  after manual  
 min []  Ultrasound, settings/location:    
 min []  Iontophoresis w/ dexamethasone, location:   
                                           []  take home patch       []  in clinic  
10 min [x]  Ice     []  Heat    location/position: to L ankle and gastroc in semi-recline post-session  
 min []  Vasopneumatic Device, press/temp:   
 min []  Other:   
[] Skin assessment post-treatment (if applicable):   
[]  intact    []  redness- no adverse reaction    
[]redness  adverse reaction:     
35/54 min Therapeutic Exercise:  [x]  See flow sheet Rationale:      increase ROM, increase strength, improve coordination, improve balance and increase proprioception to improve the patients ability to ambulate 15 min Manual Therapy: DTM/TPR to (L) gastroc, peroneals, post tib; gentle post TCJ mob and lateral STJ mob; manual (L) gastroc stretch Rationale:      increase ROM and increase tissue extensibility to improve patient's ability to regain full L ankle functional mobility to return to recreational walking. Billed With/As: 
 [x] TE 
 [] TA 
 [] Neuro 
 [] Self Care Patient Education: [x] Review HEP [x] Progressed/Changed HEP based on:  
[x] positioning   [] body mechanics   [] transfers   [] heat/ice application   
[] other:   
Other Objective/Functional Measures: 
 
1:1 TE + MT = 50' Cont TTP along medial gastroc and decrease TCJ and STJ mobility Noted increase grinding and popping during ankle mobilization Added rockerboard and increase repetitions with BAPs board Post Treatment Pain Level (on 0 to 10) scale:  4-5  / 10 ASSESSMENT Assessment/Changes in Function:  
 
Reviewed additional exercises to HEP to assure proper technique and understanding. Also discussed importance of performing HEP exercises for gentle stretching and no increase pain. Educated pt on activity pacing at work, such as reducing amount of time in prolonged sitting and attempting to standing/walk/peform HEP stretches throughout his day. Pt responded with good understanding. []  See Progress Note/Recertification Patient will continue to benefit from skilled PT services to modify and progress therapeutic interventions, address functional mobility deficits, address ROM deficits, address strength deficits, analyze and address soft tissue restrictions, analyze and cue movement patterns, analyze and modify body mechanics/ergonomics, assess and modify postural abnormalities and instruct in home and community integration to attain remaining goals. Progress toward goals / Updated goals: Able to perform SLS on ground for 20 seconds (progressing towards LTG #2) PLAN 
[]  Upgrade activities as tolerated yes Continue plan of care  
[]  Discharge due to :   
[]  Other:   
 
Therapist: ANGIE Miguel Date: 2/21/2019 Time: 7:21 PM  
 
Future Appointments Date Time Provider Marcio Aquino 2/21/2019  4:30 PM Tristin [address] [place] [address]  
2/26/2019  4:30 PM [facility]  
2/28/2019  4:30 PM [facility]  
3/5/2019  5:00 PM Hope Garcia [facility]  
3/7/2019  5:00 PM Yin Harden [facility]  
3/14/2019 11:15 AM Sherry Reynolds MD [address] [place] [address]  
5/1/2019 11:15 AM Wilson Almodovar MD [address] [place] [address]

## 2019-02-26 ENCOUNTER — HOSPITAL ENCOUNTER (OUTPATIENT)
Dept: PHYSICAL THERAPY | Age: 59
Discharge: HOME | End: 2019-02-26
Payer: COMMERCIAL

## 2019-02-26 PROCEDURE — 97140 MANUAL THERAPY 1/> REGIONS: CPT

## 2019-02-26 PROCEDURE — 97110 THERAPEUTIC EXERCISES: CPT

## 2019-02-26 NOTE — PROGRESS NOTES
PHYSICAL THERAPY - DAILY TREATMENT NOTE Patient Name: Matt Sample        Date: 2019 : 1960   yes Patient  Verified Visit #:      of   12  Insurance: Payor: BLUE CROSS / Plan: [address] [place] [address] / Product Type: PPO / In time: 4:10 PM Out time: 5:20 PM  
Total Treatment Time: 79 Medicare/[facility] Time Tracking (below) Total Timed Codes (min):  60 1:1 Treatment Time:  50 TREATMENT AREA =  Left ankle pain [M25.572] SUBJECTIVE Pain Level (on 0 to 10 scale): 4-5 / 10 Medication Changes/New allergies or changes in medical history, any new surgeries or procedures?    no  If yes, update Summary List  
Subjective Functional Status/Changes:  []  No changes reported Pt reports having a better day today, however noted significant increase in L ankle pain 5 mins after LV. States that he is unsure whether it was PT or something else he had done. Also reports that he's been doing his updated HEP stretches throughout his day. OBJECTIVE Modalities Rationale:     decrease inflammation and decrease pain to improve patient's ability to perform functional ADLs 
 min [] Estim, type/location:   
                                 []  att     []  unatt     []  w/US     []  w/ice    []  w/heat 
 min []  Mechanical Traction: type/lbs   
               []  pro   []  sup   []  int   []  cont    []  before manual    []  after manual  
 min []  Ultrasound, settings/location:    
 min []  Iontophoresis w/ dexamethasone, location:   
                                           []  take home patch       []  in clinic  
10 min [x]  Ice     []  Heat    location/position: to L ankle and gastroc in semi-recline post-session  
 min []  Vasopneumatic Device, press/temp:   
 min []  Other:   
[] Skin assessment post-treatment (if applicable):   
[]  intact    []  redness- no adverse reaction    
[]redness  adverse reaction:     
35/45 min Therapeutic Exercise:  [x]  See flow sheet Rationale:      increase ROM, increase strength, improve coordination, improve balance and increase proprioception to improve the patients ability to ambulate 15 min Manual Therapy: DTM/TPR to (L) gastroc, peroneals, post tib; gentle post TCJ mob and lateral STJ mob; manual (L) gastroc stretch Rationale:      increase ROM and increase tissue extensibility to improve patient's ability to regain full L ankle functional mobility to return to recreational walking. Billed With/As: 
 [x] TE 
 [] TA 
 [] Neuro 
 [] Self Care Patient Education: [x] Review HEP [x] Progressed/Changed HEP based on:  
[x] positioning   [] body mechanics   [] transfers   [] heat/ice application   
[] other:   
Other Objective/Functional Measures: 
 
1:1 TE + MT = 50' Noted decrease tenderness and ms tightness along medial gastroc Challenged with maintaining neutral foot during SLS and noted minimal LOB requiring UE assistance for recovery. Noted \"painful popping\" during recovery of LOB. Post Treatment Pain Level (on 0 to 10) scale:  4 / 10 ASSESSMENT Assessment/Changes in Function:  
 
Decrease L ankle stability contributes with decrease balance and increase pain. Advised pt to utilize OTC ankle stability brace to improve L ankle stability and improve activity tolerance; pt responded with understanding. []  See Progress Note/Recertification Patient will continue to benefit from skilled PT services to modify and progress therapeutic interventions, address functional mobility deficits, address ROM deficits, address strength deficits, analyze and address soft tissue restrictions, analyze and cue movement patterns, analyze and modify body mechanics/ergonomics, assess and modify postural abnormalities and instruct in home and community integration to attain remaining goals. Progress toward goals / Updated goals: No significant progress towards PT goals PLAN 
 []  Upgrade activities as tolerated yes Continue plan of care  
[]  Discharge due to :   
[]  Other:   
 
Therapist: ANGIE Vann Date: 2/26/2019 Time: 6:55 PM  
 
Future Appointments Date Time Provider Marcio Aquino 2/26/2019  4:30 PM [facility]  
2/28/2019  4:30 PM [facility]  
3/5/2019  5:00 PM [facility]  
3/7/2019  7:30 AM [facility] [address] [facility] CV SERV [facility]  
3/7/2019  8:15 AM [facility] NM DOSE RM 1 [facility]  
3/7/2019  9:00 AM [facility] STRESS/EXERCISE/TREADMILL [facility]  
3/7/2019  5:00 PM [facility]  
3/14/2019 11:15 AM Adry Hand MD [address] [place] [address]  
5/1/2019 11:15 AM Juan Antonio Garner MD [facility]

## 2019-02-28 ENCOUNTER — HOSPITAL ENCOUNTER (OUTPATIENT)
Dept: PHYSICAL THERAPY | Age: 59
Discharge: HOME | End: 2019-02-28
Payer: COMMERCIAL

## 2019-02-28 PROCEDURE — 97110 THERAPEUTIC EXERCISES: CPT

## 2019-02-28 PROCEDURE — 97140 MANUAL THERAPY 1/> REGIONS: CPT

## 2019-02-28 NOTE — PROGRESS NOTES
PHYSICAL THERAPY - DAILY TREATMENT NOTE Patient Name: Robb Pretty        Date: 2019 : 1960   yes Patient  Verified Visit #:   6      12  Insurance: Payor: BLUE CROSS / Plan: [address] [place] [address] / Product Type: PPO / In time: 4:09 PM Out time: 5:10 PM  
Total Treatment Time: 64 Medicare/[employer] Time Tracking (below) Total Timed Codes (min):  61 1:1 Treatment Time:  41 TREATMENT AREA =  Left ankle pain [M25.572] SUBJECTIVE Pain Level (on 0 to 10 scale): 7-8 / 10 Medication Changes/New allergies or changes in medical history, any new surgeries or procedures?    no  If yes, update Summary List  
Subjective Functional Status/Changes:  []  No changes reported Pt reports that he notices that he has more pain when he's driving his [place] vs his truck. Also reports he bought new OTC stability ankle braces that shoulder be coming in the mail on Monday OBJECTIVE 
26/46 min Therapeutic Exercise:  [x]  See flow sheet Rationale:      increase ROM, increase strength, improve coordination, improve balance and increase proprioception to improve the patients ability to ambulate 15 min Manual Therapy: DTM/TPR to (L) gastroc, peroneals, post tib; gentle post TCJ mob and lateral STJ mob; manual (L) gastroc stretch Rationale:      increase ROM and increase tissue extensibility to improve patient's ability to regain full L ankle functional mobility to return to recreational walking. Billed With/As: 
 [x] TE 
 [] TA 
 [] Neuro 
 [] Self Care Patient Education: [x] Review HEP [x] Progressed/Changed HEP based on:  
[x] positioning   [] body mechanics   [] transfers   [] heat/ice application   
[] other:   
Other Objective/Functional Measures: 
 
1:1 TE + MT = 41' FOTO: 30 (2 point increase) GROC: +2 
  
Post Treatment Pain Level (on 0 to 10) scale:   10 ASSESSMENT Assessment/Changes in Function: Able to perform therex without increase pain and cont to note decrease in subjective pain following PT session. []  See Progress Note/Recertification Patient will continue to benefit from skilled PT services to modify and progress therapeutic interventions, address functional mobility deficits, address ROM deficits, address strength deficits, analyze and address soft tissue restrictions, analyze and cue movement patterns, analyze and modify body mechanics/ergonomics, assess and modify postural abnormalities and instruct in home and community integration to attain remaining goals. Progress toward goals / Updated goals: 
Minimal progress towards FOTO goal (LTG #3) PLAN 
[]  Upgrade activities as tolerated yes Continue plan of care  
[]  Discharge due to :   
[]  Other:   
 
Therapist: ANGIE Bella Date: 2/28/2019 Time: 7:08 PM  
 
Future Appointments Date Time Provider Marcio Aquino 2/28/2019  4:30 PM Nisa Crow [facility]  
3/5/2019  5:00 PM Aimee [facility]  
3/7/2019  7:30 AM [facility] [address] [facility] CV SERV [facility]  
3/7/2019  8:15 AM [facility] NM DOSE RM 1 [facility]  
3/7/2019  9:00 AM [facility] STRESS/EXERCISE/TREADMILL [facility]  
3/7/2019  5:00 PM Aimee [facility]  
3/14/2019 11:15 AM Laura Keenan MD [address] [place] [address]  
5/1/2019 11:15 AM Jose Alfredo Kong MD [facility]

## 2019-03-05 ENCOUNTER — HOSPITAL ENCOUNTER (OUTPATIENT)
Dept: PHYSICAL THERAPY | Age: 59
Discharge: HOME | End: 2019-03-05
Payer: COMMERCIAL

## 2019-03-05 PROCEDURE — 97140 MANUAL THERAPY 1/> REGIONS: CPT

## 2019-03-05 PROCEDURE — 97110 THERAPEUTIC EXERCISES: CPT

## 2019-03-05 NOTE — PROGRESS NOTES
PHYSICAL THERAPY - DAILY TREATMENT NOTE    Patient Name: Nicole Regaaldo        Date: 3/5/2019  : 1960   yes Patient  Verified  Visit #:     Insurance: Payor: Collin Camarillo / Plan: [address] [place] [address] / Product Type: PPO /      In time: 4:19 PM Out time: 5:39 PM   Total Treatment Time: 80     Medicare/[employer] Time Tracking (below)   Total Timed Codes (min):  70 1:1 Treatment Time:  60     TREATMENT AREA =  Left ankle pain [M25.572]    SUBJECTIVE  Pain Level (on 0 to 10 scale): 5 / 10   Medication Changes/New allergies or changes in medical history, any new surgeries or procedures?    no  If yes, update Summary List   Subjective Functional Status/Changes:  []  No changes reported     Pt reports feeling better today compared to yesterday. States that he was in a lot of pain yesterday due to prolonged standing. Denies utilizing ice afterwards.    Reports 5-10% overall improvement since beginning PT and states max pain 9/10 last night since beginning PT.        OBJECTIVE  Modalities Rationale:     decrease inflammation and decrease pain to improve patient's ability to perform functional ADLs   min [] Estim, type/location:                                      []  att     []  unatt     []  w/US     []  w/ice    []  w/heat    min []  Mechanical Traction: type/lbs                   []  pro   []  sup   []  int   []  cont    []  before manual    []  after manual    min []  Ultrasound, settings/location:      min []  Iontophoresis w/ dexamethasone, location:                                               []  take home patch       []  in clinic   10 min [x]  Ice     []  Heat    location/position: to L ankle and gastroc in semi-recline post-session    min []  Vasopneumatic Device, press/temp:     min []  Other:    [] Skin assessment post-treatment (if applicable):    []  intact    []  redness- no adverse reaction     []redness  adverse reaction:        45/55 min Therapeutic Exercise:  [x]  See flow sheet Rationale:      increase ROM, increase strength, improve coordination, improve balance and increase proprioception to improve the patients ability to ambulate     15 min Manual Therapy: DTM/TPR to (L) gastroc, peroneals, post tib; (L) post TCJ mob and lateral STJ mob; manual (L) gastroc/soleus stretch   Rationale:      increase ROM and increase tissue extensibility to improve patient's ability to regain full L ankle functional mobility to return to recreational walking. Billed With/As:   [x] TE   [] TA   [] Neuro   [] Self Care Patient Education: [x] Review HEP    [] Progressed/Changed HEP based on:   [] positioning   [] body mechanics   [] transfers   [] heat/ice application    [] other:       Other Objective/Functional Measures:    1:1 TE + MT = 60'    L ankle DF AROM: 10 deg    Added bunion balance exercise    Inc tenderness and trigger points in L gastroc      Post Treatment Pain Level (on 0 to 10) scale:  3-4 / 10     ASSESSMENT  Assessment/Changes in Function:     Pt reports minimal carryover with reduced pain and improved functional mobility activities (walking, standing, sitting) following PT sessions. Discussed importance of demonstrating progress for justification of continuation of PT.      []  See Progress Note/Recertification   Patient will continue to benefit from skilled PT services to modify and progress therapeutic interventions, address functional mobility deficits, address ROM deficits, address strength deficits, analyze and address soft tissue restrictions, analyze and cue movement patterns, analyze and modify body mechanics/ergonomics, assess and modify postural abnormalities and instruct in home and community integration to attain remaining goals.    Progress toward goals / Updated goals:    LTG #1 MET       PLAN  []  Upgrade activities as tolerated yes Continue plan of care   []  Discharge due to :    []  Other:      Therapist: ANGIE Xavier    Date: 3/5/2019 Time: 6:55 PM Future Appointments   Date Time Provider Marcio Aquino   3/5/2019  5:00 PM [facility]   3/7/2019  7:30 AM [facility] [address] [facility] CV SERV [facility]   3/7/2019  8:15 AM [facility] NM DOSE RM 1 [facility]   3/7/2019  9:00 AM [facility] STRESS/EXERCISE/TREADMILL [facility]   3/7/2019  5:00 PM [place] [facility]   3/14/2019 11:15 AM Mary Harper MD [address] [place] [address]   5/1/2019 11:15 AM Cordelia Diaz MD [facility]

## 2019-03-07 ENCOUNTER — HOSPITAL ENCOUNTER (OUTPATIENT)
Dept: NON INVASIVE DIAGNOSTICS | Age: 59
Discharge: HOME | End: 2019-03-07
Attending: INTERNAL MEDICINE
Payer: COMMERCIAL

## 2019-03-07 ENCOUNTER — HOSPITAL ENCOUNTER (OUTPATIENT)
Dept: NUCLEAR MEDICINE | Age: 59
Discharge: HOME | End: 2019-03-07
Attending: INTERNAL MEDICINE
Payer: COMMERCIAL

## 2019-03-07 ENCOUNTER — HOSPITAL ENCOUNTER (OUTPATIENT)
Dept: PHYSICAL THERAPY | Age: 59
Discharge: HOME | End: 2019-03-07
Payer: COMMERCIAL

## 2019-03-07 VITALS
HEIGHT: 72 IN | DIASTOLIC BLOOD PRESSURE: 79 MMHG | WEIGHT: 244 LBS | SYSTOLIC BLOOD PRESSURE: 148 MMHG | BODY MASS INDEX: 33.05 KG/M2

## 2019-03-07 VITALS
HEIGHT: 72 IN | BODY MASS INDEX: 33.05 KG/M2 | WEIGHT: 244 LBS | SYSTOLIC BLOOD PRESSURE: 128 MMHG | DIASTOLIC BLOOD PRESSURE: 78 MMHG

## 2019-03-07 DIAGNOSIS — R93.1 ABNORMAL SCREENING CARDIAC CT: ICD-10-CM

## 2019-03-07 DIAGNOSIS — R53.83 FATIGUE, UNSPECIFIED TYPE: ICD-10-CM

## 2019-03-07 LAB
STRESS BASELINE HR: 57 BPM
STRESS ESTIMATED WORKLOAD: 8.7 METS
STRESS EXERCISE DUR MIN: NORMAL
STRESS PEAK DIAS BP: 78 MMHG
STRESS PEAK SYS BP: 171 MMHG
STRESS PERCENT HR ACHIEVED: 84 %
STRESS POST PEAK HR: 114 BPM
STRESS RATE PRESSURE PRODUCT: NORMAL BPM*MMHG
STRESS ST DEPRESSION: 1 MM
STRESS TARGET HR: 136 BPM

## 2019-03-07 PROCEDURE — 97140 MANUAL THERAPY 1/> REGIONS: CPT

## 2019-03-07 PROCEDURE — 97110 THERAPEUTIC EXERCISES: CPT

## 2019-03-07 PROCEDURE — 74011250636 HC RX REV CODE- 250/636: Performed by: INTERNAL MEDICINE

## 2019-03-07 PROCEDURE — 93017 CV STRESS TEST TRACING ONLY: CPT

## 2019-03-07 PROCEDURE — A9500 TC99M SESTAMIBI: HCPCS

## 2019-03-07 PROCEDURE — 93306 TTE W/DOPPLER COMPLETE: CPT

## 2019-03-07 RX ADMIN — REGADENOSON 0.4 MG: 0.08 INJECTION, SOLUTION INTRAVENOUS at 10:04

## 2019-03-07 NOTE — PROGRESS NOTES
PHYSICAL THERAPY - DAILY TREATMENT NOTE    Patient Name: Doris Bowden        Date: 3/7/2019  : 1960   yes Patient  Verified  Visit #:     Insurance: Payor: Carmen Severance / Plan: [address] [place] [address] / Product Type: PPO /      In time: 4:13 PM Out time: 5:35 PM   Total Treatment Time: 82     Medicare/[employer] Time Tracking (below)   Total Timed Codes (min):  72 1:1 Treatment Time:  55     TREATMENT AREA =  Left ankle pain [M25.572]    SUBJECTIVE  Pain Level (on 0 to 10 scale):  10   Medication Changes/New allergies or changes in medical history, any new surgeries or procedures?    no  If yes, update Summary List   Subjective Functional Status/Changes:  []  No changes reported     Presented to PT session with OTC ankle brace.  States that he feels that his balance and overall ankle stability is better since beginning PT, however continues to have fluctuating pain between -7/10       OBJECTIVE  Modalities Rationale:     decrease inflammation and decrease pain to improve patient's ability to perform functional ADLs   min [] Estim, type/location:                                      []  att     []  unatt     []  w/US     []  w/ice    []  w/heat    min []  Mechanical Traction: type/lbs                   []  pro   []  sup   []  int   []  cont    []  before manual    []  after manual    min []  Ultrasound, settings/location:      min []  Iontophoresis w/ dexamethasone, location:                                               []  take home patch       []  in clinic   10 min [x]  Ice     []  Heat    location/position: to L ankle and gastroc in supine with elevation     min []  Vasopneumatic Device, press/temp:     min []  Other:    [] Skin assessment post-treatment (if applicable):    []  intact    []  redness- no adverse reaction     []redness  adverse reaction:        40/57 min Therapeutic Exercise:  [x]  See flow sheet   Rationale:      increase ROM, increase strength, improve coordination, improve balance and increase proprioception to improve the patients ability to ambulate     15 min Manual Therapy: DTM/TPR to (L) gastroc, peroneals, post tib; (L) ankle PROM in all directions; manual (L) gastroc/soleus stretch   Rationale:      increase ROM and increase tissue extensibility to improve patient's ability to regain full L ankle functional mobility to return to recreational walking. Billed With/As:   [x] TE   [] TA   [] Neuro   [] Self Care Patient Education: [x] Review HEP    [] Progressed/Changed HEP based on:   [] positioning   [] body mechanics   [] transfers   [] heat/ice application    [] other:       Other Objective/Functional Measures:    1:1 TE + MT = 54'    Added lateral/fwd step downs and step ups  Progressed to standing rockerboard and BAPs board     Post Treatment Pain Level (on 0 to 10) scale:  3-4 / 10     ASSESSMENT  Assessment/Changes in Function:     Presented with ankle pain with lateral and fwd step downs and noted aubdible popping with therex; cues to utilize UE and increase eccentric control with quadriceps. Noted decrease popping and decrease pain following cues. []  See Progress Note/Recertification   Patient will continue to benefit from skilled PT services to modify and progress therapeutic interventions, address functional mobility deficits, address ROM deficits, address strength deficits, analyze and address soft tissue restrictions, analyze and cue movement patterns, analyze and modify body mechanics/ergonomics, assess and modify postural abnormalities and instruct in home and community integration to attain remaining goals.    Progress toward goals / Updated goals:    No significant progress towards PT goals  Reassess LTGs NV for progress note       PLAN  []  Upgrade activities as tolerated yes Continue plan of care   []  Discharge due to :    []  Other:      Therapist: ANGIE Butts    Date: 3/7/2019 Time: 6:52 PM     Future Appointments   Date Time Provider Marcio Aquino   3/7/2019  5:00 PM Felicia Cole [facility]   3/14/2019 11:15 AM Vonda Resendez MD [address] [place] [address]   3/14/2019  4:30 PM Ram[facility]rez [facility]   3/21/2019  4:00 PM Ramírez [facility]   [facility]   5/1/2019 11:15 AM Trip Avila MD [facility]

## 2019-03-12 ENCOUNTER — HOSPITAL ENCOUNTER (OUTPATIENT)
Dept: PHYSICAL THERAPY | Age: 59
Discharge: HOME | End: 2019-03-12
Payer: COMMERCIAL

## 2019-03-12 PROCEDURE — 97110 THERAPEUTIC EXERCISES: CPT

## 2019-03-12 PROCEDURE — 97140 MANUAL THERAPY 1/> REGIONS: CPT

## 2019-03-12 NOTE — PROGRESS NOTES
PHYSICAL THERAPY - DAILY TREATMENT NOTE    Patient Name: Susan Christianson        Date: 3/12/2019  : 1960   yes Patient  Verified  Visit #:     Insurance: Payor: Tisha Chi / Plan: [address] [place] [address] / Product Type: PPO /      In time: 4:00 PM Out time: 5:16 PM   Total Treatment Time: 76     Medicare/[facility] Time Tracking (below)   Total Timed Codes (min):  66 1:1 Treatment Time:  40     TREATMENT AREA =  Left ankle pain [M25.572]    SUBJECTIVE  Pain Level (on 0 to 10 scale): 4 / 10   Medication Changes/New allergies or changes in medical history, any new surgeries or procedures?    no  If yes, update Summary List   Subjective Functional Status/Changes:  []  No changes reported     SEE PN       OBJECTIVE  Modalities Rationale:     decrease inflammation and decrease pain to improve patient's ability to perform functional ADLs   min [] Estim, type/location:                                      []  att     []  unatt     []  w/US     []  w/ice    []  w/heat    min []  Mechanical Traction: type/lbs                   []  pro   []  sup   []  int   []  cont    []  before manual    []  after manual    min []  Ultrasound, settings/location:      min []  Iontophoresis w/ dexamethasone, location:                                               []  take home patch       []  in clinic   10 min [x]  Ice     []  Heat    location/position: to L ankle and gastroc in supine with elevation     min []  Vasopneumatic Device, press/temp:     min []  Other:    [] Skin assessment post-treatment (if applicable):    []  intact    []  redness- no adverse reaction     []redness  adverse reaction:        30/56 min Therapeutic Exercise:  [x]  See flow sheet   Rationale:      increase ROM, increase strength, improve coordination, improve balance and increase proprioception to improve the patients ability to ambulate     10 min Manual Therapy: DTM/TPR to (L) gastroc, peroneals, post tib; (L) ankle PROM in all directions; manual (L) gastroc/soleus stretch   Rationale:      increase ROM and increase tissue extensibility to improve patient's ability to regain full L ankle functional mobility to return to recreational walking. Billed With/As:   [x] TE   [] TA   [] Neuro   [] Self Care Patient Education: [x] Review HEP    [] Progressed/Changed HEP based on:   [] positioning   [] body mechanics   [] transfers   [] heat/ice application    [] other:       Other Objective/Functional Measures:    1:1 TE + MT = 40'    SEE PN     Post Treatment Pain Level (on 0 to 10) scale:  0 / 10     ASSESSMENT  Assessment/Changes in Function:     SEE PN     []  See Progress Note/Recertification   Patient will continue to benefit from skilled PT services to modify and progress therapeutic interventions, address functional mobility deficits, address ROM deficits, address strength deficits, analyze and address soft tissue restrictions, analyze and cue movement patterns, analyze and modify body mechanics/ergonomics, assess and modify postural abnormalities and instruct in home and community integration to attain remaining goals.    Progress toward goals / Updated goals:    SEE PN       PLAN  []  Upgrade activities as tolerated yes Continue plan of care   []  Discharge due to :    []  Other:      Therapist: ANGIE Albarran    Date: 3/12/2019 Time: 6:44 PM     Future Appointments   Date Time Provider Marcio Aquino   3/12/2019  4:00 PM [facility]   3/14/2019 11:15 AM Iliana Murillo MD [address] [place] [address]   3/14/2019  4:30 PM Marilou Crow [facility]   3/19/2019  4:30 PM Teresa Haider [facility]   3/21/2019  4:00 PM [facility]   3/26/2019  4:30 PM Marilou Crow [facility]   3/28/2019  4:30 PM Marilou Crow [facility]   4/2/2019  [facility]   5/1/2019 11:15 AM Luigi Pearce MD [facility]

## 2019-03-12 NOTE — PROGRESS NOTES
Marya Jimenez 29  [facility] THERAPY  [address] [facility], [address] [facility] - Phone: [phone]  Fax: [phone]  PROGRESS NOTE  Patient Name: Arpan Farley : 1960   Treatment/Medical Diagnosis: Left ankle pain [M25.572]   Referral Source: Ellen Velasquez MD     Date of Initial Visit: 19 Attended Visits: 9 Missed Visits: 0     SUMMARY OF TREATMENT  Pt has attended 9 PT sessions, including an initial evaluation, for L ankle pain. PT has included therapeutic exercises, manual therapy, patient education, and home exercise program to improve L ankle ROM, flexibility, strength, and endurance. Utilization of cold pack to prevent soreness and decrease pain. CURRENT STATUS  Pt has made steady progress towards PT goals for his L ankle pain. Reports 10-20% overall improvement since beginning PT; states pain ranges from 2/10 to 9/10. Reports wearing OT velcro ankle brace and orthotics; noted minimal improvement in ankle stability. Noted painful, audible \"popping\" within ankle joint during single leg and step down exercises; pt was advised to utilize UE to reduce pain. Current functional mobility tolerance before onset of L ankle pain are as follows: standing = immediate, walking = 30 minutes, sitting = dependent on seat position. Current FOTO = 30/100 (+2 increase) and +2 (a little better) on global rating of change (GROC) denoting minimal functional improvement. Goal/Measure of Progress Goal Met? 1. Improve L ankle DF AROM by at least 10 deg to improve ability to descend stairs, squat   Status at last Eval: -5 degrees Current Status: 10 degrees met   2. Pt will maintain SLS x 20\" on Airex pad to improve ankle stability ambulating on unlevel surfaces   Status at last Eval: N/A Current Status: Able to perform SLS on ground for 20 seconds Slow progress   3.   Improve FOTO score to >/= 47/100 to indicate improved function   Status at last Eval: 28/100 Current Status: 30/100 Minimal progress     New Goals to be achieved in __2-3__  weeks:  1. Pt to be independent and compliant with long-term HEP to maintain gains made from physical therapy. 2. Pt will maintain SLS x 20\" on Airex pad to improve ankle stability ambulating on unlevel surfaces  3. Improve FOTO score to >/= 47/100 to indicate improved function    RECOMMENDATIONS  Pt would continue to benefit from skilled PT for 1-2x/wk for 2-3 weeks to address remaining functional limitations and assure proper technique and compliance with long-term HEP. If you have any questions/comments please contact us directly at [phone]. Thank you for allowing us to assist in the care of your patient. Therapist Signature: ANGIE Rodgers/Tigist Antonio PT, DPT, CMTPT Date: 3/12/2019     Time: 8:03 PM   NOTE TO PHYSICIAN:  PLEASE COMPLETE THE ORDERS BELOW AND FAX TO   [facility]: [phone]  If you are unable to process this request in 24 hours please contact our office: [phone]    ___ I have read the above report and request that my patient continue as recommended.   ___ I have read the above report and request that my patient continue therapy with the following changes/special instructions:_________________________________________________________   ___ I have read the above report and request that my patient be discharged from therapy.      Physician Signature:        Date:       Time:

## 2019-03-14 ENCOUNTER — HOSPITAL ENCOUNTER (OUTPATIENT)
Dept: PHYSICAL THERAPY | Age: 59
End: 2019-03-14
Payer: COMMERCIAL

## 2019-03-14 ENCOUNTER — OFFICE VISIT (OUTPATIENT)
Dept: CARDIOLOGY CLINIC | Age: 59
End: 2019-03-14

## 2019-03-14 VITALS
HEIGHT: 72 IN | WEIGHT: 246 LBS | HEART RATE: 66 BPM | OXYGEN SATURATION: 98 % | BODY MASS INDEX: 33.32 KG/M2 | SYSTOLIC BLOOD PRESSURE: 131 MMHG | DIASTOLIC BLOOD PRESSURE: 82 MMHG

## 2019-03-14 DIAGNOSIS — R93.1 ABNORMAL SCREENING CARDIAC CT: Primary | ICD-10-CM

## 2019-03-14 DIAGNOSIS — E78.00 PURE HYPERCHOLESTEROLEMIA: ICD-10-CM

## 2019-03-14 DIAGNOSIS — I10 ESSENTIAL HYPERTENSION WITH GOAL BLOOD PRESSURE LESS THAN 140/90: ICD-10-CM

## 2019-03-14 NOTE — PROGRESS NOTES
1. Have you been to the ER, urgent care clinic since your last visit? Hospitalized since your last visit? No    2. Have you seen or consulted any other health care providers outside of the [address] [place] [address] since your last visit? Include any pap smears or colon screening.  No

## 2019-03-14 NOTE — PROGRESS NOTES
[facility]    Mr. Cuong Dixon is 59 y.o. male with history of diabetes, hyperlipidemia. Patient is here today for follow-up appointment. He does not have any symptoms to report since last time. He denies any palpitation presyncope or syncope. He denies any chest pain or chest tightness. He is limited in terms of functional capacity because of his multiple joint problems of lower extremity. Denies any nausea, vomiting, abdominal pain, fever, chills, sputum production. No hematuria or other bleeding complaints    Past Medical History:   Diagnosis Date    Contact dermatitis and other eczema, due to unspecified cause     skin cancer    Diabetes ([facility] [address]     Hepatitis B 12/4/2013    Hepatomegaly 12/16/2013    HLD (hyperlipidemia)     Tobacco abuse, in remission          Past Surgical History:   Procedure Laterality Date    HX CARPAL TUNNEL RELEASE      HX COLONOSCOPY  2/8/2016    Repeat colonoscopy in 5 yrs per Dr. Michael Duke      right hip x 2    HX ORTHOPAEDIC      bilateral elbows/wrist       Current Outpatient Medications   Medication Sig    metoprolol succinate (TOPROL-XL) 25 mg XL tablet Take 1 Tab by mouth daily.  aspirin delayed-release 81 mg tablet Take 1 Tab by mouth daily.  atorvastatin (LIPITOR) 40 mg tablet Take 1 Tab by mouth daily.  glimepiride (AMARYL) 2 mg tablet TAKE 1 TABLET BY MOUTH ONCE DAILY IN THE MORNING    metFORMIN ER (GLUCOPHAGE XR) 750 mg tablet Take 1 Tab by mouth daily (with dinner).  diclofenac EC (VOLTAREN) 75 mg EC tablet TAKE ONE TABLET BY MOUTH TWICE DAILY AS NEEDED    sildenafil citrate (VIAGRA) 50 mg tablet Take 1 Tab by mouth daily as needed.  lancets (ONETOUCH DELICA LANCETS) 30 gauge misc Test bs daily E11.9    glucose blood VI test strips (ONETOUCH ULTRA TEST) strip Test bs daily.   E11.9     No current facility-administered medications for this visit. Allergies and Sensitivities:  Allergies   Allergen Reactions    Codeine Itching       Family History:  Family History   Problem Relation Age of Onset    Alcohol abuse Mother     Suicide Mother     Heart Attack Father        Social History:  Social History     Tobacco Use    Smoking status: Former Smoker     Packs/day: 0.50     Years: 45.00     Pack years: 22.50     Types: Cigarettes     Last attempt to quit: 2016     Years since quittin.8    Smokeless tobacco: Never Used   Substance Use Topics    Alcohol use: Yes     Comment: very little    Drug use: No     He  reports that he quit smoking about 2 years ago. His smoking use included cigarettes. He has a 22.50 pack-year smoking history. he has never used smokeless tobacco.  He  reports that he drinks alcohol. Review of Systems:  Cardiac symptoms as noted above in HPI. All others negative. Denies fatigue, malaise, skin rash, joint pain, blurring vision, photophobia, neck pain, hemoptysis, chronic cough, nausea, vomiting, hematuria, burning micturition, BRBPR, chronic headaches. Physical Exam:  BP Readings from Last 3 Encounters:   19 131/82   19 148/79   19 128/78         Pulse Readings from Last 3 Encounters:   19 66   19 88   19 60          Wt Readings from Last 3 Encounters:   19 246 lb (111.6 kg)   19 244 lb (110.7 kg)   19 244 lb (110.7 kg)       Constitutional: Oriented to person, place, and time. HENT: Head: Normocephalic and atraumatic. Neck: No JVD present. Carotid bruit is not appreciated. Cardiovascular: Regular rhythm. No murmur, gallop or rubs appreciated  Lung: Breath sounds normal. No respiratory distress. No ronchi or rales appreciated  Abdominal: No tenderness. No rebound and no guarding. Musculoskeletal: There is no lower extremity edema.  No cynosis    Review of Data  LABS:   Lab Results   Component Value Date/Time    Sodium 141 01/31/2019 07:40 AM    Potassium 4.6 01/31/2019 07:40 AM    Chloride 105 01/31/2019 07:40 AM    CO2 29 01/31/2019 07:40 AM    Glucose 140 (H) 01/31/2019 07:40 AM    BUN 14 01/31/2019 07:40 AM    Creatinine 1.06 01/31/2019 07:40 AM     Lipids Latest Ref Rng & Units 1/31/2019 8/29/2018 9/11/2017 10/6/2016 1/21/2016   Chol, Total <200 MG/(H) 211(H) 206(H) 207(H) 234(H)   HDL 40 - 60 MG/DL 43 36(L) 42 44 27(L)   LDL 0 - 100 MG/. 2(H) 100. 8(H) 100. 8(H) 102. 8(H) LDL AND VLDL CHOLESTEROL NOT CALCULATED WHEN TRIGLYCERIDES >400 MG/DL OR HDL CHOLESTEROL <20 MG/DL   Trig <150 MG/(H) 371(H) 316(H) 301(H) 559(H)   Chol/HDL Ratio 0 - 5.0   5.0 5.9(H) 4.9 4.7 8.7(H)   Some recent data might be hidden     Lab Results   Component Value Date/Time    ALT (SGPT) 66 (H) 01/31/2019 07:40 AM     Lab Results   Component Value Date/Time    Hemoglobin A1c 7.6 (H) 09/11/2017 07:08 AM    Hemoglobin A1c (POC) 8.0 12/31/2018 11:28 AM       EKG  (01/19) sinus bradycardia. Otherwise normal EKG. ECHO (03/19)  Left Ventricle Normal cavity size and systolic function (ejection fraction normal). Upper normal wall thickness observed. The estimated ejection fraction is 56 - 60%. Visually measured ejection fraction. No regional wall motion abnormality noted. There is mild (grade 1) left ventricular diastolic dysfunction. Left Atrium The cavity size is mildly dilated. Left Atrium volume index is 34 mL/m2. Right Ventricle Normal cavity size and global systolic function. Right Atrium Normal size. Aortic Valve Trileaflet valve structure, no stenosis and no regurgitation. Mitral Valve Normal valve structure, no stenosis and no regurgitation. Mild mitral annular calcification. Tricuspid Valve Normal valve structure and no stenosis. Tricuspid regurgitation is inadequate for estimation of right ventricular systolic pressure. STRESS TEST (03/19)  · Baseline ECG: Normal EKG.   · Gated SPECT: Left ventricular function post-stress was normal. Calculated ejection fraction is 54%. · Left ventricular perfusion is probably normal.  · There was no convincing evidence of significant reversible defect to suggest on going major ischemia. Inferior defect is most consistent with diaphragmatic attenuation artifact  · Myocardial perfusion imaging supports a low risk stress test.    Calcium score: (02/19)  1. Severe coronary artery calcification. Agatston calcium score of 554. The observed calcium score of 554 is at percentile 80 when matched for subjects of same age, gender and race/ ethnicity. 2. No mitral or aortic annular calcification. 3. Mild ectasia of the aortic root (maximum diameter 4.3 cm) and ascending (maximum diameter 3.8 cm). 4. Non cardiac findings reported separately by Radiologist (copied below). Segmental Artery scores: Normal origin and course of coronary arteries. Left main coronary artery: [0]   Left anterior descending artery: [93.47]   Left circumflex artery: [20.62]   Right coronary artery: [439.57]     IMPRESSION & PLAN:  Mr. Lyndsey Ervin is a 59 y.o. male with a history of diabetes and hyperlipidemia. Abnormal calcium scoring  Patient had a calcium scoring of the coronary artery. This was elevated at 554. This score is at 92 percentile when compared to to same age gender and ethnicity. Based on report, majority of the calcium deposit is on right coronary artery. His main symptoms at this time is dyspnea and fatigue. He does not report any chest pain chest tightness. He did undergo echocardiogram and nuclear stress test in March 2019 for follow-up for physiologic assessment. There was no convincing reversible defect. His ejection fraction is normal.  In absence of any unstable coronary symptoms, aggressive risk factor modification has been advised. Hyperlipidemia:  He is on atorvastatin 40 mg daily.   Repeat lipid profile in 6-month    Diabetes:  Goal hemoglobin C less than 7 is recommended from cardiology standpoint. Last chemo globin A1c was 8.0. Aggressive management of diabetes is recommended based on coronary calcium scoring    Obesity:  Max weight 246. Advised diet and exercise to lose weight. This plan was discussed with patient who is in agreement. Thank you for allowing me to participate in patient care. Please feel free to call me if you have any question or concern. Sherri Sandhu MD  Please note: This document has been produced using voice recognition software. Unrecognized errors in transcription may be present.

## 2019-03-19 ENCOUNTER — HOSPITAL ENCOUNTER (OUTPATIENT)
Dept: PHYSICAL THERAPY | Age: 59
Discharge: HOME | End: 2019-03-19
Payer: COMMERCIAL

## 2019-03-19 PROCEDURE — 97110 THERAPEUTIC EXERCISES: CPT

## 2019-03-19 PROCEDURE — 97140 MANUAL THERAPY 1/> REGIONS: CPT

## 2019-03-19 NOTE — PROGRESS NOTES
PHYSICAL THERAPY - DAILY TREATMENT NOTE Patient Name: Kraig Alves        Date: 3/19/2019 : 1960   yes Patient  Verified Visit #:   10   of   18  Insurance: Payor: Bertha Lundberg / Plan: [address] [place] [address] / Product Type: PPO / In time: 4:10 PM Out time: 5:15 PM  
Total Treatment Time: 76 Medicare/[facility] Time Tracking (below) Total Timed Codes (min):  65 1:1 Treatment Time:  40 TREATMENT AREA =  Left ankle pain [M25.572] SUBJECTIVE Pain Level (on 0 to 10 scale): 4 / 10 Medication Changes/New allergies or changes in medical history, any new surgeries or procedures?    no  If yes, update Summary List  
Subjective Functional Status/Changes:  []  No changes reported \"I had to cancel last appt because I twisted my L ankle really bad that I couldn't go to work the next day. But it felt like I got rid of a bone spur and it felt better. I've been putting lidocaine/deep heat massage cream on it and it's the best it's felt in the last 10 years. \" OBJECTIVE 
30/55 min Therapeutic Exercise:  [x]  See flow sheet Rationale:      increase ROM, increase strength, improve coordination, improve balance and increase proprioception to improve the patients ability to ambulate 10 min Manual Therapy: DTM/TPR to (L) gastroc, peroneals, post tib; (L) ankle PROM in all directions Rationale:      increase ROM and increase tissue extensibility to improve patient's ability to regain full L ankle functional mobility to return to recreational walking. Billed With/As: 
 [x] TE 
 [] TA 
 [] Neuro 
 [] Self Care Patient Education: [x] Review HEP [] Progressed/Changed HEP based on:  
[] positioning   [] body mechanics   [] transfers   [] heat/ice application   
[] other:   
 Other Objective/Functional Measures: 
 
1:1 TE + MT = 40' Inc popping during ankle PROM in all directions Progressed and added multiple therex (see flowsheet) to improve ankle stability and strength for prolonged standing/walking activities Post Treatment Pain Level (on 0 to 10) scale:  2 / 10 ASSESSMENT Assessment/Changes in Function:  
 
Cont to require cues throughout PT session by therapist and tech to avoid performing any therex that inc subjective pain. []  See Progress Note/Recertification Patient will continue to benefit from skilled PT services to modify and progress therapeutic interventions, address functional mobility deficits, address ROM deficits, address strength deficits, analyze and address soft tissue restrictions, analyze and cue movement patterns, analyze and modify body mechanics/ergonomics, assess and modify postural abnormalities and instruct in home and community integration to attain remaining goals. Progress toward goals / Updated goals: No significant progress towards PT goals PLAN 
[]  Upgrade activities as tolerated yes Continue plan of care  
[]  Discharge due to :   
[]  Other:   
 
Therapist: ANIGE Baker Date: 3/19/2019 Time: 7:06 PM  
 
Future Appointments Date Time Provider Marcio Aquino 3/19/2019  4:30 PM Manda Schwab [facility]  
3/21/2019  4:00 PM Tristin [facility]  
3/26/2019  4:30 PM Brennan Crow [facility]  
[facility]  
4/2/2019  4:30 PM Tristin [facility]  
5/1/2019 11:15 AM Jonathan Westfall  [id number]

## 2019-03-21 ENCOUNTER — HOSPITAL ENCOUNTER (OUTPATIENT)
Dept: PHYSICAL THERAPY | Age: 59
Discharge: HOME | End: 2019-03-21
Payer: COMMERCIAL

## 2019-03-21 PROCEDURE — 97140 MANUAL THERAPY 1/> REGIONS: CPT

## 2019-03-21 PROCEDURE — 97110 THERAPEUTIC EXERCISES: CPT

## 2019-03-21 NOTE — PROGRESS NOTES
PHYSICAL THERAPY - DAILY TREATMENT NOTE Patient Name: Shanta Wise        Date: 3/21/2019 : 1960   yes Patient  Verified Visit #:     Insurance: Payor: BLUE CROSS / Plan: [address] [place] [address] / Product Type: PPO / In time: 3:56 PM Out time: 4:49 PM  
Total Treatment Time: 48 Medicare/[employer] Time Tracking (below) Total Timed Codes (min): 53 1:1 Treatment Time:  48 TREATMENT AREA =  Left ankle pain [M25.572] SUBJECTIVE Pain Level (on 0 to 10 scale): 3 / 10 Medication Changes/New allergies or changes in medical history, any new surgeries or procedures?    no  If yes, update Summary List  
Subjective Functional Status/Changes:  []  No changes reported Pt reports feeling much better since LV. States that there was one instance today when he stood up from his chair and noted \"two twitches, possibly from the position of the thigh and hip\" OBJECTIVE 43 min Therapeutic Exercise:  [x]  See flow sheet Rationale:      increase ROM, increase strength, improve coordination, improve balance and increase proprioception to improve the patients ability to ambulate 10 min Manual Therapy: DTM/TPR to (L) gastroc, peroneals, and post tib; manual L gastroc stretch Rationale:      increase ROM and increase tissue extensibility to improve patient's ability to regain full L ankle functional mobility to return to recreational walking. Billed With/As: 
 [x] TE 
 [] TA 
 [] Neuro 
 [] Self Care Patient Education: [x] Review HEP [] Progressed/Changed HEP based on:  
[] positioning   [] body mechanics   [] transfers   [] heat/ice application   
[] other:   
 Other Objective/Functional Measures: 
 
1:1 TE + MT = 53' Increase repetitions with squats with heel elevation to improve LE strength Increase step height with lateral step ups to improve tolerance with stair negotiation Post Treatment Pain Level (on 0 to 10) scale:  3 / 10 ASSESSMENT Assessment/Changes in Function:  
 
Demo'd improved tolerance and ankle ROM with therex. []  See Progress Note/Recertification Patient will continue to benefit from skilled PT services to modify and progress therapeutic interventions, address functional mobility deficits, address ROM deficits, address strength deficits, analyze and address soft tissue restrictions, analyze and cue movement patterns, analyze and modify body mechanics/ergonomics, assess and modify postural abnormalities and instruct in home and community integration to attain remaining goals. Progress toward goals / Updated goals: No significant progress towards LTGs Pt has 3 more PT sessions then D/C with long-term HEP 
 
 
PLAN 
[]  Upgrade activities as tolerated yes Continue plan of care  
[]  Discharge due to :   
[]  Other:   
 
Therapist: ANGIE Xavier Date: 3/21/2019 Time: 5:18 PM  
 
Future Appointments Date Time Provider Marcio Aquino 3/21/2019  4:00 PM Jenni Crow [facility]  
3/26/2019  4:30 PM Jenni Crow [facility]  
3/28/2019  4:30 PM Rama Plunkett [facility]  
4/2/2019  4:30 PM Michel Crow [facility]  
5/1/2019 11:15 AM Werner Troy  [address]

## 2019-03-26 ENCOUNTER — HOSPITAL ENCOUNTER (OUTPATIENT)
Dept: PHYSICAL THERAPY | Age: 59
Discharge: HOME | End: 2019-03-26
Payer: COMMERCIAL

## 2019-03-26 PROCEDURE — 97140 MANUAL THERAPY 1/> REGIONS: CPT

## 2019-03-26 PROCEDURE — 97110 THERAPEUTIC EXERCISES: CPT

## 2019-03-26 NOTE — PROGRESS NOTES
PHYSICAL THERAPY - DAILY TREATMENT NOTE Patient Name: Con Evans        Date: 3/26/2019 : 1960   yes Patient  Verified Visit #:   15   of   18  Insurance: Payor: Ritchie Felix / Plan: [address] [place] [address] / Product Type: PPO / In time: 4:04 PM Out time: 5:06 PM  
Total Treatment Time: 58 Medicare/[facility] Time Tracking (below) Total Timed Codes (min): 62 1:1 Treatment Time:  46 TREATMENT AREA =  Left ankle pain [M25.572] SUBJECTIVE Pain Level (on 0 to 10 scale): 3 / 10 Medication Changes/New allergies or changes in medical history, any new surgeries or procedures?    no  If yes, update Summary List  
Subjective Functional Status/Changes:  []  No changes reported \"Ever since I twisted my ankle it's been feeling a lot better. I've noticed stairs aren't as bad and same goes for walking\" OBJECTIVE 
42/52 min Therapeutic Exercise:  [x]  See flow sheet Rationale:      increase ROM, increase strength, improve coordination, improve balance and increase proprioception to improve the patients ability to ambulate with normalized gait pattern 10 min Manual Therapy: DTM/TPR to (L) gastroc, peroneals, and post tib; manual L gastroc stretch Rationale:      increase ROM and increase tissue extensibility to improve patient's ability to regain full, pain free L ankle functional mobility to perform prolonged standing activities. Billed With/As: 
 [x] TE 
 [] TA 
 [] Neuro 
 [] Self Care Patient Education: [x] Review HEP [x] Progressed/Changed HEP based on:  
[] positioning   [] body mechanics   [] transfers   [] heat/ice application   
[x] other: pt inquired about walking 10+ miles on TM at an incline; pt was advised to avoid walking for prolonged period of time and on an incline. Instead advised  pt to walk 10 mins on zero incline every other day to build up tolerance as long as there are no red flags or sharp pain in L ankle. Other Objective/Functional Measures: 1:1 MT + TE = 52' Increase resistance with ankle TB to improve ankle strength and stability Post Treatment Pain Level (on 0 to 10) scale:  3 / 10 ASSESSMENT Assessment/Changes in Function:  
 
Cont to note decrease ankle stability and audible popping during [address] activities indicating plateau with current PT interventions. Will continue PT for 2 more PT sessions to assure proper technique and understanding with long-term HEP []  See Progress Note/Recertification Patient will continue to benefit from skilled PT services to modify and progress therapeutic interventions, address functional mobility deficits, address ROM deficits, address strength deficits, analyze and address soft tissue restrictions, analyze and cue movement patterns, analyze and modify body mechanics/ergonomics, assess and modify postural abnormalities and instruct in home and community integration to attain remaining goals. Progress toward goals / Updated goals: 
Pt to cont 2 more PT sessions then D/C with long-term HEP 
 
 
PLAN 
[]  Upgrade activities as tolerated yes Continue plan of care  
[]  Discharge due to :   
[]  Other:   
 
Therapist: ANGIE Villatoro Date: 3/26/2019 Time: 6:31 PM  
 
Future Appointments Date Time Provider Marcio Aquino 3/26/2019  4:30 PM Nidia Crow [facility]  
3/28/2019  4:30 PM Ashli [facility]  
[facility]  [facility]  
5/1/2019 11:15 AM Thuan Simental MD [facility]

## 2019-03-28 ENCOUNTER — HOSPITAL ENCOUNTER (OUTPATIENT)
Dept: PHYSICAL THERAPY | Age: 59
End: 2019-03-28
Payer: COMMERCIAL

## 2019-03-29 DIAGNOSIS — E11.9 TYPE 2 DIABETES MELLITUS WITHOUT COMPLICATION, WITHOUT LONG-TERM CURRENT USE OF INSULIN (HCC): ICD-10-CM

## 2019-04-01 RX ORDER — METFORMIN HYDROCHLORIDE 750 MG/1
750 TABLET, EXTENDED RELEASE ORAL
Qty: 90 TAB | Refills: 0 | Status: SHIPPED | OUTPATIENT
Start: 2019-04-01 | End: 2019-05-01 | Stop reason: ALTCHOICE

## 2019-04-02 ENCOUNTER — HOSPITAL ENCOUNTER (OUTPATIENT)
Dept: PHYSICAL THERAPY | Age: 59
Discharge: HOME | End: 2019-04-02
Payer: COMMERCIAL

## 2019-04-02 PROCEDURE — 97110 THERAPEUTIC EXERCISES: CPT

## 2019-04-02 PROCEDURE — 97140 MANUAL THERAPY 1/> REGIONS: CPT

## 2019-04-02 NOTE — PROGRESS NOTES
PHYSICAL THERAPY - DAILY TREATMENT NOTE Patient Name: Laurel Chi        Date: 2019 : 1960   yes Patient  Verified Visit #:   15   of   18  Insurance: Payor: Chika Daniels / Plan: [address] [place] [address] / Product Type: PPO / In time: 4:20 PM Out time: 5:24 PM  
Total Treatment Time: 59 Medicare/[facility] Time Tracking (below) Total Timed Codes (min): 64 1:1 Treatment Time:  40 TREATMENT AREA =  Left ankle pain [M25.572] SUBJECTIVE Pain Level (on 0 to 10 scale): 8 / 10 Medication Changes/New allergies or changes in medical history, any new surgeries or procedures?    no  If yes, update Summary List  
Subjective Functional Status/Changes:  []  No changes reported Pt reports inc pain today possibly due to rainy weather. Denies utilizing ankle brace or orthotics in the last 1-2 weeks due to causing more pain. OBJECTIVE 
25/49 min Therapeutic Exercise:  [x]  See flow sheet Rationale:      increase ROM, increase strength, improve coordination, improve balance and increase proprioception to improve the patients ability to ambulate with normalized gait pattern 15 min Manual Therapy: DTM/TPR to (L) gastroc, peroneals, and post tib; (L) post TCJ mob and lateral STJ mob Rationale:      increase ROM and increase tissue extensibility to improve patient's ability to regain full, pain free L ankle functional mobility to perform prolonged standing activities. Billed With/As: 
 [x] TE 
 [] TA 
 [] Neuro 
 [] Self Care Patient Education: [x] Review HEP [x] Progressed/Changed HEP based on:  
[] positioning   [] body mechanics   [] transfers   [] heat/ice application   
[] other:  
 Other Objective/Functional Measures: 
 
1:1 MT + TE = 40' TTP across ATFL and medial gastroc; audible popping during ankle mobs Post Treatment Pain Level (on 0 to 10) scale:   10 ASSESSMENT Assessment/Changes in Function: Able to reduce subjective pain following PT session, however cont to report inc pain when [address] Pt has reached plateau in progress with current POC and will be D/C NV with HEP. []  See Progress Note/Recertification Patient will continue to benefit from skilled PT services to modify and progress therapeutic interventions, address functional mobility deficits, address ROM deficits, address strength deficits, analyze and address soft tissue restrictions, analyze and cue movement patterns, analyze and modify body mechanics/ergonomics, assess and modify postural abnormalities and instruct in home and community integration to attain remaining goals. Progress toward goals / Updated goals: 
Reassess LTGs NV for D/C with long-term HEP  
 
PLAN 
[]  Upgrade activities as tolerated yes Continue plan of care  
[]  Discharge due to :   
[]  Other:   
 
Therapist: ANGIE Durand Date: 4/2/2019 Time: 6:30 PM  
 
Future Appointments Date Time Provider Marcio Aqiuno 4/2/2019  4:30 PM Akilah Crow [facility]  
5/1/2019 11:15 AM Radha Cartwright  [facility] [place]

## 2019-05-01 ENCOUNTER — OFFICE VISIT (OUTPATIENT)
Dept: FAMILY MEDICINE CLINIC | Age: 59
End: 2019-05-01

## 2019-05-01 VITALS
OXYGEN SATURATION: 96 % | BODY MASS INDEX: 32.91 KG/M2 | RESPIRATION RATE: 18 BRPM | WEIGHT: 243 LBS | DIASTOLIC BLOOD PRESSURE: 78 MMHG | SYSTOLIC BLOOD PRESSURE: 120 MMHG | TEMPERATURE: 98.5 F | HEART RATE: 60 BPM | HEIGHT: 72 IN

## 2019-05-01 DIAGNOSIS — R93.1 AGATSTON CORONARY ARTERY CALCIUM SCORE GREATER THAN 400: ICD-10-CM

## 2019-05-01 DIAGNOSIS — E11.9 TYPE 2 DIABETES MELLITUS WITHOUT COMPLICATION, WITHOUT LONG-TERM CURRENT USE OF INSULIN (HCC): Primary | ICD-10-CM

## 2019-05-01 DIAGNOSIS — E66.09 CLASS 1 OBESITY DUE TO EXCESS CALORIES WITH SERIOUS COMORBIDITY AND BODY MASS INDEX (BMI) OF 32.0 TO 32.9 IN ADULT: ICD-10-CM

## 2019-05-01 LAB — HBA1C MFR BLD HPLC: 7.2 %

## 2019-05-01 RX ORDER — GLIMEPIRIDE 4 MG/1
TABLET ORAL
Qty: 90 TAB | Refills: 0 | Status: SHIPPED | OUTPATIENT
Start: 2019-05-01 | End: 2019-08-08 | Stop reason: SDUPTHER

## 2019-05-01 NOTE — PROGRESS NOTES
Assessment/Plan: 1. Type 2 diabetes mellitus without complication, without long-term current use of insulin ([facility]) 
-hold metformin x 1 mo to see if that's contributing to his \"lack of desire to do anything\". incr amaryl to compensate - AMB POC HEMOGLOBIN A1C 
-  DIABETES FOOT EXAM 
- glimepiride (AMARYL) 4 mg tablet; TAKE 1 TABLET BY MOUTH ONCE DAILY IN THE MORNING  Dispense: 90 Tab; Refill: 0 
 
2. Class 1 obesity due to excess calories with serious comorbidity and body mass index (BMI) of 32.0 to 32.9 in adult 
-work on wt loss 3. Agatston coronary artery calcium score greater than 400 
-management per cards. The plan was discussed with the patient. The patient verbalized understanding and is in agreement with the plan. All medication potential side effects were discussed with the patient. Health Maintenance:  
Health Maintenance Topic Date Due  Shingrix Vaccine Age 50> (2 of 2) 02/25/2019  
 HEMOGLOBIN A1C Q6M  06/30/2019  Influenza Age 5 to Adult  08/01/2019  MICROALBUMIN Q1  08/03/2019  LIPID PANEL Q1  01/31/2020  
 FOOT EXAM Q1  05/01/2020  
 EYE EXAM RETINAL OR DILATED  08/07/2020  COLONOSCOPY  01/28/2021  
 DTaP/Tdap/Td series (2 - Td) 12/04/2023  Hepatitis C Screening  Completed  Pneumococcal 0-64 years  Completed Sarita Webster is a 62 y.o. male and presents with Diabetes Subjective: 
DM2 - A1c is 7.2. Improved from 8. He's doing nutri-system, but hasn't been able to lose wt. He's not active due to joint pain. He states he doesn't know if he's depressed. He wonders if his mood is a side effect from the medications he takes or from his co-morbidities associated with hemochromatosis. PHQ 9 is negative for depression. He states he just \"doesn't care\" about doing things he should. ROS: 
Constitutional: No recent weight change. No weakness/fatigue. No f/c. Cardiovascular: No CP/palpitations. No CARDOZA/orthopnea/PND. Respiratory: No cough/sputum, dyspnea, wheezing. Gastointestinal: No dysphagia, reflux. No n/v. No constipation/diarrhea. No melena/rectal bleeding. The problem list was updated as a part of today's visit. Patient Active Problem List  
Diagnosis Code  Transaminitis R74.0  Bilateral wrist pain M25.531, M25.532  
 History of carpal tunnel release Z98.890  
 Exposure to hepatitis B Z20.5  Post-traumatic osteonecrosis of left ankle ([address] I58.231  HLD (hyperlipidemia) E78.5  Hemochromatosis M12.768  
 Mild diastolic dysfunction D67.2  Benign prostatic hyperplasia with lower urinary tract symptoms N40.1  Hypogonadism in male E29.1  Fibrosis of liver K74.0  Type 2 diabetes mellitus without complication, without long-term current use of insulin (HCC) E11.9  Liver lesion, right lobe K76.9  Agatston coronary artery calcium score greater than 400 R93.1 The PSH, FH were reviewed. SH: Social History Tobacco Use  Smoking status: Former Smoker Packs/day: 0.50 Years: 45.00 Pack years: 22.50 Types: Cigarettes Last attempt to quit: 5/1/2016 Years since quitting: 3.0  Smokeless tobacco: Never Used Substance Use Topics  Alcohol use: Yes Comment: very little  Drug use: No  
 
 
Medications/Allergies: 
Current Outpatient Medications on File Prior to Visit Medication Sig Dispense Refill  metFORMIN ER (GLUCOPHAGE XR) 750 mg tablet Take 1 Tab by mouth daily (with dinner). 90 Tab 0  
 metoprolol succinate (TOPROL-XL) 25 mg XL tablet Take 1 Tab by mouth daily. 30 Tab 6  
 aspirin delayed-release 81 mg tablet Take 1 Tab by mouth daily. 30 Tab 6  
 atorvastatin (LIPITOR) 40 mg tablet Take 1 Tab by mouth daily.  90 Tab 1  
 glimepiride (AMARYL) 2 mg tablet TAKE 1 TABLET BY MOUTH ONCE DAILY IN THE MORNING 90 Tab 0  
 diclofenac EC (VOLTAREN) 75 mg EC tablet TAKE ONE TABLET BY MOUTH TWICE DAILY AS NEEDED 60 Tab 11  
  sildenafil citrate (VIAGRA) 50 mg tablet Take 1 Tab by mouth daily as needed. 9 Tab 11  
 lancets (ONETOUCH DELICA LANCETS) 30 gauge misc Test bs daily E11.9 100 Lancet 3  
 glucose blood VI test strips (ONETOUCH ULTRA TEST) strip Test bs daily. E11.9 100 Strip 3 No current facility-administered medications on file prior to visit. Allergies Allergen Reactions  Codeine Itching Objective: 
Visit Vitals /78 (BP 1 Location: Left arm, BP Patient Position: Sitting) Pulse 60 Temp 98.5 °F (36.9 °C) (Oral) Resp 18 Ht 6' (1.829 m) Wt 243 lb (110.2 kg) SpO2 96% BMI 32.96 kg/m² Constitutional: Well developed, nourished, no distress, alert, obese habitus CV: S1, S2.  RRR. No murmurs/rubs. Pulm: No abnormalities on inspection. Clear to auscultation bilaterally. No wheezing/rhonchi. Normal effort. GI: Soft, nontender, nondistended. Normal active bowel sounds.

## 2019-05-01 NOTE — PROGRESS NOTES
Iron Holt is a 62 y.o. male (: 1960) presenting to address: Chief Complaint Patient presents with  Diabetes Vitals:  
 19 1106 BP: 120/78 Pulse: 60 Resp: 18 Temp: 98.5 °F (36.9 °C) TempSrc: Oral  
SpO2: 96% Weight: 243 lb (110.2 kg) Height: 6' (1.829 m) PainSc:   4 PainLoc: Ankle Learning Assessment:  
 
Learning Assessment 2/10/2015 PRIMARY LEARNER Patient HIGHEST LEVEL OF EDUCATION - PRIMARY LEARNER  > 4 YEARS OF COLLEGE  
BARRIERS PRIMARY LEARNER NONE  
CO-LEARNER CAREGIVER No  
PRIMARY LANGUAGE ENGLISH  
LEARNER PREFERENCE PRIMARY DEMONSTRATION  
  -  
ANSWERED BY patient RELATIONSHIP SELF Depression Screening:  
 
3 most recent PHQ Screens 2019 PHQ Not Done (No Data) Little interest or pleasure in doing things (No Data) Feeling down, depressed, irritable, or hopeless (No Data) Total Score PHQ 2 - Fall Risk Assessment:  
 
Fall Risk Assessment, last 12 mths 2019 Able to walk? Yes Fall in past 12 months? No  
 
Abuse Screening:  
 
Abuse Screening Questionnaire 2019 Do you ever feel afraid of your partner? Gail Azar Are you in a relationship with someone who physically or mentally threatens you? Gail Azar Is it safe for you to go home? Jeff Guthrie Coordination of Care Questionaire: 1. Have you been to the ER, urgent care clinic since your last visit? Hospitalized since your last visit? NO 
 
2. Have you seen or consulted any other health care providers outside of the [address] [place] [address] since your last visit? Include any pap smears or colon screening. YES dentist 
 
Advanced Directive: 1. Do you have an Advanced Directive? YES 
 
2. Would you like information on Advanced Directives?  NO

## 2019-08-06 ENCOUNTER — PATIENT MESSAGE (OUTPATIENT)
Dept: FAMILY MEDICINE CLINIC | Age: 59
End: 2019-08-06

## 2019-08-08 DIAGNOSIS — E11.9 TYPE 2 DIABETES MELLITUS WITHOUT COMPLICATION, WITHOUT LONG-TERM CURRENT USE OF INSULIN (HCC): ICD-10-CM

## 2019-08-08 RX ORDER — ASPIRIN 81 MG/1
81 TABLET ORAL DAILY
Qty: 90 TAB | Refills: 3 | Status: SHIPPED | OUTPATIENT
Start: 2019-08-08 | End: 2020-08-24 | Stop reason: SDUPTHER

## 2019-08-08 RX ORDER — GLIMEPIRIDE 4 MG/1
TABLET ORAL
Qty: 90 TAB | Refills: 0 | Status: SHIPPED | OUTPATIENT
Start: 2019-08-08 | End: 2019-08-21 | Stop reason: SDUPTHER

## 2019-08-21 ENCOUNTER — OFFICE VISIT (OUTPATIENT)
Dept: FAMILY MEDICINE CLINIC | Age: 59
End: 2019-08-21

## 2019-08-21 VITALS
HEART RATE: 61 BPM | RESPIRATION RATE: 18 BRPM | BODY MASS INDEX: 32.89 KG/M2 | DIASTOLIC BLOOD PRESSURE: 80 MMHG | OXYGEN SATURATION: 93 % | TEMPERATURE: 96.9 F | SYSTOLIC BLOOD PRESSURE: 128 MMHG | HEIGHT: 72 IN | WEIGHT: 242.8 LBS

## 2019-08-21 DIAGNOSIS — M25.572 CHRONIC PAIN OF LEFT ANKLE: ICD-10-CM

## 2019-08-21 DIAGNOSIS — Z23 ENCOUNTER FOR IMMUNIZATION: ICD-10-CM

## 2019-08-21 DIAGNOSIS — G89.29 CHRONIC PAIN OF LEFT ANKLE: ICD-10-CM

## 2019-08-21 DIAGNOSIS — E11.9 TYPE 2 DIABETES MELLITUS WITHOUT COMPLICATION, WITHOUT LONG-TERM CURRENT USE OF INSULIN (HCC): Primary | ICD-10-CM

## 2019-08-21 LAB
ALBUMIN UR QL STRIP: 10 MG/L
CREATININE, URINE POC: 50 MG/DL
HBA1C MFR BLD HPLC: 7.6 %
MICROALBUMIN/CREAT RATIO POC: <30 MG/G

## 2019-08-21 RX ORDER — GLIMEPIRIDE 4 MG/1
4 TABLET ORAL 2 TIMES DAILY
Qty: 180 TAB | Refills: 0 | Status: SHIPPED | OUTPATIENT
Start: 2019-08-21 | End: 2019-08-26 | Stop reason: SDUPTHER

## 2019-08-21 NOTE — PROGRESS NOTES
Assessment/Plan:    1. Type 2 diabetes mellitus without complication, without long-term current use of insulin ([address]  -7. 6. incr dose of amaryl to bid. - AMB POC HEMOGLOBIN A1C  - AMB POC URINE, MICROALBUMIN, SEMIQUANT (3 RESULTS)    2. Chronic pain of left ankle  -referral to ortho. - REFERRAL TO ORTHOPEDICS    3. Encounter for immunization  - INFLUENZA VIRUS VAC QUAD,SPLIT,PRESV FREE SYRINGE IM  - AK IMMUNIZ ADMIN,1 SINGLE/COMB VAC/TOXOID  - ZOSTER VACC RECOMBINANT ADJUVANTED      The plan was discussed with the patient. The patient verbalized understanding and is in agreement with the plan. All medication potential side effects were discussed with the patient. Health Maintenance:   Health Maintenance   Topic Date Due    Shingrix Vaccine Age 49> (2 of 2) 02/25/2019    Influenza Age 5 to Adult  08/01/2019    MICROALBUMIN Q1  08/03/2019    HEMOGLOBIN A1C Q6M  11/01/2019    LIPID PANEL Q1  01/31/2020    FOOT EXAM Q1  05/01/2020    EYE EXAM RETINAL OR DILATED  08/07/2020    COLONOSCOPY  01/28/2021    DTaP/Tdap/Td series (2 - Td) 12/04/2023    Hepatitis C Screening  Completed    Pneumococcal 0-64 years  Completed       Pauly Gilliland is a 62 y.o. 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 He has a pulmonary nodule with plan for wedge resection for 9/2019. He has know arthritis associated with hemochromatosis. He has been eval by [facility] previously (did L hip replacement) and [facility]. Both told him he needs surgery. He prefers to go back to [facility]. DM2 - A1c is 7.6. Not checking bs. Had side effects with metformin. Only on amaryl. Not watching his diet. ROS:  Constitutional: No recent weight change. No weakness/fatigue. No f/c. Cardiovascular: No CP/palpitations. No CARDOZA/orthopnea/PND.    Respiratory: No cough/sputum, dyspnea, wheezing. Gastointestinal: No dysphagia, reflux. No n/v. No constipation/diarrhea. No melena/rectal bleeding. Musculoskeletal: + joint pain/stiffness. No muscle pain/tenderness. The problem list was updated as a part of today's visit. Patient Active Problem List   Diagnosis Code    Transaminitis R74.0    Bilateral wrist pain M25.531, M25.532    History of carpal tunnel release Z98.890    Exposure to hepatitis B Z20.5    Post-traumatic osteonecrosis of left ankle (HCC) M87.272    HLD (hyperlipidemia) E78.5    Hemochromatosis S66.151    Mild diastolic dysfunction J84.4    Benign prostatic hyperplasia with lower urinary tract symptoms N40.1    Hypogonadism in male E29.1    Fibrosis of liver K74.0    Type 2 diabetes mellitus without complication, without long-term current use of insulin (HCC) E11.9    Liver lesion, right lobe K76.9    Agatston coronary artery calcium score greater than 400 R93.1    Class 1 obesity due to excess calories with serious comorbidity and body mass index (BMI) of 32.0 to 32.9 in adult E66.09, Z68.32       The PSH, FH were reviewed. SH:  Social History     Tobacco Use    Smoking status: Former Smoker     Packs/day: 0.50     Years: 45.00     Pack years: 22.50     Types: Cigarettes     Last attempt to quit: 5/1/2016     Years since quitting: 3.3    Smokeless tobacco: Never Used   Substance Use Topics    Alcohol use: Yes     Comment: very little    Drug use: No       Medications/Allergies:  Current Outpatient Medications on File Prior to Visit   Medication Sig Dispense Refill    phenylephrine/hydrocodone/cpm (HYDROCODONE CP PO) Take 5 mg by mouth as needed.  aspirin delayed-release 81 mg tablet Take 1 Tab by mouth daily. 90 Tab 3    glimepiride (AMARYL) 4 mg tablet TAKE 1 TABLET BY MOUTH ONCE DAILY IN THE MORNING 90 Tab 0    metoprolol succinate (TOPROL-XL) 25 mg XL tablet Take 1 Tab by mouth daily.  30 Tab 6    atorvastatin (LIPITOR) 40 mg tablet Take 1 Tab by mouth daily. 90 Tab 1    diclofenac EC (VOLTAREN) 75 mg EC tablet TAKE ONE TABLET BY MOUTH TWICE DAILY AS NEEDED 60 Tab 11    lancets (ONETOUCH DELICA LANCETS) 30 gauge misc Test bs daily E11.9 100 Lancet 3    glucose blood VI test strips (ONETOUCH ULTRA TEST) strip Test bs daily. E11.9 100 Strip 3    sildenafil citrate (VIAGRA) 50 mg tablet Take 1 Tab by mouth daily as needed. 9 Tab 11     No current facility-administered medications on file prior to visit. Allergies   Allergen Reactions    Codeine Itching       Objective:  Visit Vitals  /80 (BP 1 Location: Left arm, BP Patient Position: Sitting)   Pulse 61   Temp 96.9 °F (36.1 °C) (Oral)   Resp 18   Ht 6' (1.829 m)   Wt 242 lb 12.8 oz (110.1 kg)   SpO2 93%   BMI 32.93 kg/m²      Constitutional: Well developed, nourished, no distress, alert, obese habitus   CV: S1, S2.  RRR. No murmurs/rubs. No edema. Pulm: No abnormalities on inspection. Clear to auscultation bilaterally. No wheezing/rhonchi. Normal effort. GI: Soft, nontender, nondistended. Normal active bowel sounds. Psych: Appropriate affect, judgement and insight. Short-term memory intact.

## 2019-08-21 NOTE — PROGRESS NOTES
Chan Zavaleta is a 62 y.o. male (: 1960) presenting to address:    Chief Complaint   Patient presents with    Diabetes    Ankle Pain     Bilateral       Vitals:    19 1038 19 1045   BP: (!) 131/91 128/80   Pulse: 61    Resp: 18    Temp: 96.9 °F (36.1 °C)    TempSrc: Oral    SpO2: 93%    Weight: 242 lb 12.8 oz (110.1 kg)    Height: 6' (1.829 m)    PainSc:   7    PainLoc: Ankle        Learning Assessment:     Learning Assessment 2/10/2015   PRIMARY LEARNER Patient   HIGHEST LEVEL OF EDUCATION - PRIMARY LEARNER  > 4 YEARS OF COLLEGE   BARRIERS PRIMARY LEARNER NONE   CO-LEARNER CAREGIVER No   PRIMARY LANGUAGE ENGLISH   LEARNER PREFERENCE PRIMARY DEMONSTRATION     -   ANSWERED BY patient   RELATIONSHIP SELF     Depression Screening:     3 most recent PHQ Screens 2019   PHQ Not Done -   Little interest or pleasure in doing things Not at all   Feeling down, depressed, irritable, or hopeless Not at all   Total Score PHQ 2 0   Trouble falling or staying asleep, or sleeping too much -   Feeling tired or having little energy -   Poor appetite, weight loss, or overeating -   Feeling bad about yourself - or that you are a failure or have let yourself or your family down -   Trouble concentrating on things such as school, work, reading, or watching TV -   Moving or speaking so slowly that other people could have noticed; or the opposite being so fidgety that others notice -   Thoughts of being better off dead, or hurting yourself in some way -   PHQ 9 Score -   How difficult have these problems made it for you to do your work, take care of your home and get along with others -     Fall Risk Assessment:     Fall Risk Assessment, last 12 mths 2019   Able to walk? Yes   Fall in past 12 months? No     Abuse Screening:     Abuse Screening Questionnaire 2019   Do you ever feel afraid of your partner? N   Are you in a relationship with someone who physically or mentally threatens you?  N   Is it safe for you to go home? Y     Coordination of Care Questionaire:   1. Have you been to the ER, urgent care clinic since your last visit? Hospitalized since your last visit? NO    2. Have you seen or consulted any other health care providers outside of the [address] [place] [address] since your last visit? Include any pap smears or colon screening. YES- surgeon    Advanced Directive:   1. Do you have an Advanced Directive? YES    2. Would you like information on Advanced Directives?  NO

## 2019-08-21 NOTE — PATIENT INSTRUCTIONS
Vaccine Information Statement    Influenza (Flu) Vaccine (Inactivated or Recombinant): What You Need to Know    Many Vaccine Information Statements are available in German and other languages. See www.immunize.org/vis  Hojas de información sobre vacunas están disponibles en español y en muchos otros idiomas. Visite www.immunize.org/vis    1. Why get vaccinated? Influenza vaccine can prevent influenza (flu). Flu is a contagious disease that spreads around the [facility] every year, usually between October and May. Anyone can get the flu, but it is more dangerous for some people. Infants and young children, people 72years of age and older, pregnant women, and people with certain health conditions or a weakened immune system are at greatest risk of flu complications. Pneumonia, bronchitis, sinus infections and ear infections are examples of flu-related complications. If you have a medical condition, such as heart disease, cancer or diabetes, flu can make it worse. Flu can cause fever and chills, sore throat, muscle aches, fatigue, cough, headache, and runny or stuffy nose. Some people may have vomiting and diarrhea, though this is more common in children than adults. Each year thousands of people in the [facility] die from flu, and many more are hospitalized. Flu vaccine prevents millions of illnesses and flu-related visits to the doctor each year. 2. Influenza vaccines     CDC recommends everyone 10months of age and older get vaccinated every flu season. Children 6 months through 6years of age may need 2 doses during a single flu season. Everyone else needs only 1 dose each flu season. It takes about 2 weeks for protection to develop after vaccination. There are many flu viruses, and they are always changing. Each year a new flu vaccine is made to protect against three or four viruses that are likely to cause disease in the upcoming flu season.  Even when the vaccine doesnt exactly match these viruses, it may still provide some protection. Influenza vaccine does not cause flu. Influenza vaccine may be given at the same time as other vaccines. 3. Talk with your health care provider    Tell your vaccine provider if the person getting the vaccine:   Has had an allergic reaction after a previous dose of influenza vaccine, or has any severe, life-threatening allergies.  Has ever had Guillain-Barré Syndrome (also called GBS). In some cases, your health care provider may decide to postpone influenza vaccination to a future visit. People with minor illnesses, such as a cold, may be vaccinated. People who are moderately or severely ill should usually wait until they recover before getting influenza vaccine. Your health care provider can give you more information. 4. Risks of a reaction     Soreness, redness, and swelling where shot is given, fever, muscle aches, and headache can happen after influenza vaccine.  There may be a very small increased risk of Guillain-Barré Syndrome (GBS) after inactivated influenza vaccine (the flu shot). [address] [place] [address] children who get the flu shot along with pneumococcal vaccine (PCV13), and/or DTaP vaccine at the same time might be slightly more likely to have a seizure caused by fever. Tell your health care provider if a child who is getting flu vaccine has ever had a seizure. People sometimes faint after medical procedures, including vaccination. Tell your provider if you feel dizzy or have vision changes or ringing in the ears. As with any medicine, there is a very remote chance of a vaccine causing a severe allergic reaction, other serious injury, or death. 5. What if there is a serious problem? An allergic reaction could occur after the vaccinated person leaves the clinic.  If you see signs of a severe allergic reaction (hives, swelling of the face and throat, difficulty breathing, a fast heartbeat, dizziness, or weakness), call 9-1-1 and get the person to the nearest hospital.    For other signs that concern you, call your health care provider. Adverse reactions should be reported to the Vaccine Adverse Event Reporting System (VAERS). Your health care provider will usually file this report, or you can do it yourself. Visit the VAERS website at www.vaers. [facility].gov or call 3-139.794.8957. VAERS is only for reporting reactions, and VAERS staff do not give medical advice. 6. The National Vaccine Injury Compensation Program    The [facility] Vaccine Injury Compensation Program (VICP) is a federal program that was created to compensate people who may have been injured by certain vaccines. Visit the VICP website at www.[facility].gov/vaccinecompensation or call 8-306.748.5336 to learn about the program and about filing a claim. There is a time limit to file a claim for compensation. 7. How can I learn more?  Ask your health care provider.  Call your local or state health department.  Contact the Centers for Disease Control and Prevention (CDC):  - Call 9-132.212.7652 (1-800-CDC-INFO) or  - Visit CDCs influenza website at www.cdc.gov/flu    Vaccine Information Statement (Interim)  Inactivated Influenza Vaccine   8/15/2019  [address] Code 476DZ-25   Department of Health and Human Services  Centers for Disease Control and Prevention    Office Use Only

## 2019-08-23 ENCOUNTER — DOCUMENTATION ONLY (OUTPATIENT)
Dept: FAMILY MEDICINE CLINIC | Age: 59
End: 2019-08-23

## 2019-08-23 NOTE — PROGRESS NOTES
Verbal order for correction of sig to [address]. Note said increase amaryl to two times daily. Sig reads bid and once daily. They will fill for twice daily.

## 2019-08-26 DIAGNOSIS — E11.9 TYPE 2 DIABETES MELLITUS WITHOUT COMPLICATION, WITHOUT LONG-TERM CURRENT USE OF INSULIN (HCC): ICD-10-CM

## 2019-08-26 RX ORDER — GLIMEPIRIDE 4 MG/1
4 TABLET ORAL 2 TIMES DAILY
Qty: 180 TAB | Refills: 0 | Status: SHIPPED | OUTPATIENT
Start: 2019-08-26 | End: 2019-10-23 | Stop reason: SDUPTHER

## 2019-08-28 DIAGNOSIS — M25.579 CHRONIC ANKLE PAIN, UNSPECIFIED LATERALITY: Primary | ICD-10-CM

## 2019-08-28 DIAGNOSIS — G89.29 CHRONIC ANKLE PAIN, UNSPECIFIED LATERALITY: Primary | ICD-10-CM

## 2019-08-28 RX ORDER — TRAMADOL HYDROCHLORIDE 50 MG/1
50 TABLET ORAL
Qty: 7 TAB | Refills: 0 | Status: SHIPPED | OUTPATIENT
Start: 2019-08-28 | End: 2019-09-03 | Stop reason: SDUPTHER

## 2019-09-03 DIAGNOSIS — G89.29 CHRONIC ANKLE PAIN, UNSPECIFIED LATERALITY: ICD-10-CM

## 2019-09-03 DIAGNOSIS — M25.579 CHRONIC ANKLE PAIN, UNSPECIFIED LATERALITY: ICD-10-CM

## 2019-09-03 RX ORDER — TRAMADOL HYDROCHLORIDE 50 MG/1
50 TABLET ORAL
Qty: 21 TAB | Refills: 0 | Status: SHIPPED | OUTPATIENT
Start: 2019-09-03 | End: 2020-03-13 | Stop reason: SDUPTHER

## 2019-10-23 ENCOUNTER — OFFICE VISIT (OUTPATIENT)
Dept: FAMILY MEDICINE CLINIC | Age: 59
End: 2019-10-23

## 2019-10-23 VITALS
WEIGHT: 235 LBS | HEART RATE: 64 BPM | SYSTOLIC BLOOD PRESSURE: 129 MMHG | DIASTOLIC BLOOD PRESSURE: 75 MMHG | OXYGEN SATURATION: 94 % | TEMPERATURE: 97.1 F | BODY MASS INDEX: 31.83 KG/M2 | RESPIRATION RATE: 18 BRPM | HEIGHT: 72 IN

## 2019-10-23 DIAGNOSIS — R93.1 AGATSTON CORONARY ARTERY CALCIUM SCORE GREATER THAN 400: ICD-10-CM

## 2019-10-23 DIAGNOSIS — M87.272: ICD-10-CM

## 2019-10-23 DIAGNOSIS — E78.00 PURE HYPERCHOLESTEROLEMIA: ICD-10-CM

## 2019-10-23 DIAGNOSIS — E11.9 TYPE 2 DIABETES MELLITUS WITHOUT COMPLICATION, WITHOUT LONG-TERM CURRENT USE OF INSULIN (HCC): ICD-10-CM

## 2019-10-23 DIAGNOSIS — Z01.810 PREOP CARDIOVASCULAR EXAM: Primary | ICD-10-CM

## 2019-10-23 RX ORDER — ATORVASTATIN CALCIUM 40 MG/1
40 TABLET, FILM COATED ORAL DAILY
Qty: 90 TAB | Refills: 3 | Status: SHIPPED | OUTPATIENT
Start: 2019-10-23 | End: 2020-08-25 | Stop reason: SDUPTHER

## 2019-10-23 RX ORDER — ATORVASTATIN CALCIUM 40 MG/1
40 TABLET, FILM COATED ORAL DAILY
Qty: 90 TAB | Refills: 3 | Status: SHIPPED | OUTPATIENT
Start: 2019-10-23 | End: 2019-10-23 | Stop reason: SDUPTHER

## 2019-10-23 RX ORDER — GLIMEPIRIDE 4 MG/1
4 TABLET ORAL 2 TIMES DAILY
Qty: 180 TAB | Refills: 0 | Status: SHIPPED | OUTPATIENT
Start: 2019-10-23 | End: 2020-02-06 | Stop reason: SDUPTHER

## 2019-10-23 NOTE — PROGRESS NOTES
Soy Mays is a 62 y.o. male and presents for pre-operative evaluation. Subjective: This patient was seen at the request of Dr. Dangelo Horne for pre-operative evaluation for planned procedure: L total ankle arthroplasty with possible achilles tendon lengthening on 11/8/19 under general anesthesia. I haven't seen him in over 5 mos. In [facility] records, he has A1c of 9.3 with bs most recently in upper 300s. He states he's \"not checking his sugars because I thought I was good\". He's intol metformin b/c it \"makes him a couch potato\". Additionally, when I reviewed his severe CAD on cardiac CT and asked him why he wasn't taking lipitor, he stated \"he was taken off in the hospital\". He demonstrates poor insight into his medical condition. He states he didn't know about the preop visit or testing until just last week. Cardiac factors include:  CAD (severe coronary calcifications on cardiac CT), calcium score 540/92%ile  DMII, a1c 6.8 0/7/4296  Mild diastolic CHF, preserved EF    METs: 4    ROS:  Constitutional: No recent weight change. No weakness/fatigue. No f/c. Skin: No rashes, change in nails/hair, itching   HENT: No HA, dizziness. No hearing loss/tinnitus. No nasal congestion/discharge. Eyes: No change in vision, double/blurred vision or eye pain/redness. Cardiovascular: No CP/palpitations. No CARDOZA/orthopnea/PND. Respiratory: No cough/sputum, dyspnea, wheezing. Gastointestinal: No dysphagia, reflux. No n/v. No constipation/diarrhea. No melena/rectal bleeding. Genitourinary: No dysuria, urinary hesitancy, nocturia, hematuria. No incontinence. Musculoskeletal: No joint pain/stiffness. No muscle pain/tenderness. Heme: No h/o anemia. No easy bleeding/bruising. Neurological: No seizures/numbness/weakness. No paresthesias.      PMH:  Patient Active Problem List   Diagnosis Code    Bilateral wrist pain M25.531, M25.532    History of carpal tunnel release Z98.890    Exposure to hepatitis B Z20.5    Post-traumatic osteonecrosis of left ankle (HCC) M87.272    HLD (hyperlipidemia) E78.5    Hemochromatosis R57.833    Mild diastolic dysfunction F49.5    Benign prostatic hyperplasia with lower urinary tract symptoms N40.1    Hypogonadism in male E29.1    Fibrosis of liver K74.0    Type 2 diabetes mellitus without complication, without long-term current use of insulin (HCC) E11.9    Liver lesion, right lobe K76.9    Agatston coronary artery calcium score greater than 400 R93.1    Class 1 obesity due to excess calories with serious comorbidity and body mass index (BMI) of 32.0 to 32.9 in adult E66.09, Z68.32       PSH:  Past Surgical History:   Procedure Laterality Date    HX CARPAL TUNNEL RELEASE      HX COLONOSCOPY  2/8/2016    Repeat colonoscopy in 5 yrs per Dr. Willem Henning    HX HEENT      lasik    HX HIP REPLACEMENT      right hip x 2    HX ORTHOPAEDIC      bilateral elbows/wrist        SH:  Social History     Tobacco Use    Smoking status: Former Smoker     Packs/day: 0.50     Years: 45.00     Pack years: 22.50     Types: Cigarettes     Last attempt to quit: 5/1/2016     Years since quitting: 3.4    Smokeless tobacco: Never Used   Substance Use Topics    Alcohol use: Yes     Comment: very little    Drug use: No       FH:  Family History   Problem Relation Age of Onset    Alcohol abuse Mother     Suicide Mother     Heart Attack Father        Medications/Allergies:  Current Outpatient Medications on File Prior to Visit   Medication Sig Dispense Refill    glimepiride (AMARYL) 4 mg tablet Take 1 Tab by mouth two (2) times a day. 180 Tab 0    aspirin delayed-release 81 mg tablet Take 1 Tab by mouth daily. 90 Tab 3    diclofenac EC (VOLTAREN) 75 mg EC tablet TAKE ONE TABLET BY MOUTH TWICE DAILY AS NEEDED 60 Tab 11    lancets (ONETOUCH DELICA LANCETS) 30 gauge misc Test bs daily E11.9 100 Lancet 3    glucose blood VI test strips (ONETOUCH ULTRA TEST) strip Test bs daily.   E11.9 100 Strip 3    metoprolol succinate (TOPROL-XL) 25 mg XL tablet Take 1 Tab by mouth daily. 30 Tab 6    atorvastatin (LIPITOR) 40 mg tablet Take 1 Tab by mouth daily. 90 Tab 1    sildenafil citrate (VIAGRA) 50 mg tablet Take 1 Tab by mouth daily as needed. 9 Tab 11     No current facility-administered medications on file prior to visit. Allergies   Allergen Reactions    Codeine Itching       Objective:  Visit Vitals  /75 (BP 1 Location: Right arm, BP Patient Position: Sitting)   Pulse 64   Temp 97.1 °F (36.2 °C) (Oral)   Resp 18   Ht 6' (1.829 m)   Wt 235 lb (106.6 kg)   SpO2 94%   BMI 31.87 kg/m²      Gen: Well developed, nourished, no distress, alert, obese habitus   HENT: Exterior ears and tympanic membranes normal bilaterally. Supple neck. No thyromegaly or lymphadenopathy. Oropharynx clear and moist mucous membranes. Eyes: Conjunctiva normal. PERRL. CV: S1, S2.  RRR. No murmurs/rubs. No thrills palpated. No carotid bruits. Intact distal pulses. No edema. Pulm: No abnormalities on inspection. Clear to auscultation bilaterally. No wheezing/rhonchi. Normal effort. GI: Soft, nontender, nondistended. Normal active bowel sounds. No  masses on palpation. No hepatosplenomegaly. Neuro: A/O x 3. No focal motor or sensory deficits. Speech normal.   Skin: No lesions/rashes on inspection. Psych: Appropriate affect, judgement and insight. Limited insight into medical condition. Labwork and Ancillary Studies:   CXR: reviewed in [facility], normal, no acute process  EK19 reviewed in [facility]: NSR, LVH, no ST changes    Assessment/Plan:    The patient is moderate risk for planned procedure given his co-morbidities. However, he is now considered optimized for surgery as his A1c is 6.8. He can proceed to OR w/o further testing.      The following recommendations were made for medication regimen prior to surgery:  Stop ASA one week prior to surgery  Hold amaryl on the day of surgery. Hold NSAIDs 7 days prior to surgery. I will continue to follow along with the patient's treatment and care. For any questions please do not hesitate to call the office at [phone].       Ashley Jimenez MD

## 2019-10-23 NOTE — PROGRESS NOTES
Martine Butt is a 62 y.o. male (: 1960) presenting to address:    Chief Complaint   Patient presents with    Pre-op Exam      Sx Ankle Replacement       Vitals:    10/23/19 1008   BP: 129/75   Pulse: 64   Resp: 18   Temp: 97.1 °F (36.2 °C)   TempSrc: Oral   SpO2: 94%   Weight: 235 lb (106.6 kg)   Height: 6' (1.829 m)   PainSc:   8   PainLoc: Ankle       Hearing/Vision:   No exam data present    Learning Assessment:     Learning Assessment 2/10/2015   PRIMARY LEARNER Patient   HIGHEST LEVEL OF EDUCATION - PRIMARY LEARNER  > 4 YEARS OF COLLEGE   BARRIERS PRIMARY LEARNER NONE   CO-LEARNER CAREGIVER No   PRIMARY LANGUAGE ENGLISH   LEARNER PREFERENCE PRIMARY DEMONSTRATION     -   ANSWERED BY patient   RELATIONSHIP SELF     Depression Screening:     3 most recent PHQ Screens 10/23/2019   PHQ Not Done -   Little interest or pleasure in doing things Not at all   Feeling down, depressed, irritable, or hopeless Not at all   Total Score PHQ 2 0   Trouble falling or staying asleep, or sleeping too much -   Feeling tired or having little energy -   Poor appetite, weight loss, or overeating -   Feeling bad about yourself - or that you are a failure or have let yourself or your family down -   Trouble concentrating on things such as school, work, reading, or watching TV -   Moving or speaking so slowly that other people could have noticed; or the opposite being so fidgety that others notice -   Thoughts of being better off dead, or hurting yourself in some way -   PHQ 9 Score -   How difficult have these problems made it for you to do your work, take care of your home and get along with others -     Fall Risk Assessment:     Fall Risk Assessment, last 12 mths 2019   Able to walk? Yes   Fall in past 12 months? No     Abuse Screening:     Abuse Screening Questionnaire 2019   Do you ever feel afraid of your partner? N   Are you in a relationship with someone who physically or mentally threatens you?  N   Is it safe for you to go home? Y     Coordination of Care Questionaire:   1. Have you been to the ER, urgent care clinic since your last visit? Hospitalized since your last visit? NO    2. Have you seen or consulted any other health care providers outside of the [address] [place] [address] since your last visit? Include any pap smears or colon screening. YES    Advanced Directive:   1. Do you have an Advanced Directive? YES    2. Would you like information on Advanced Directives?  NO

## 2019-10-24 DIAGNOSIS — E11.9 TYPE 2 DIABETES MELLITUS WITHOUT COMPLICATION, WITHOUT LONG-TERM CURRENT USE OF INSULIN (HCC): ICD-10-CM

## 2019-10-24 PROBLEM — I25.10 CORONARY ARTERY DISEASE INVOLVING NATIVE CORONARY ARTERY OF NATIVE HEART WITHOUT ANGINA PECTORIS: Status: ACTIVE | Noted: 2019-10-24

## 2019-10-24 RX ORDER — FLASH GLUCOSE SENSOR
KIT MISCELLANEOUS
Qty: 2 KIT | Refills: 0 | Status: SHIPPED | OUTPATIENT
Start: 2019-10-24 | End: 2020-01-09 | Stop reason: ALTCHOICE

## 2019-10-25 ENCOUNTER — TELEPHONE (OUTPATIENT)
Dept: FAMILY MEDICINE CLINIC | Age: 59
End: 2019-10-25

## 2019-10-28 RX ORDER — LANCETS 28 GAUGE
EACH MISCELLANEOUS
Qty: 100 LANCET | Refills: 1 | Status: SHIPPED | OUTPATIENT
Start: 2019-10-28

## 2019-11-08 DIAGNOSIS — E11.9 TYPE 2 DIABETES MELLITUS WITHOUT COMPLICATION, WITHOUT LONG-TERM CURRENT USE OF INSULIN (HCC): Primary | ICD-10-CM

## 2019-11-08 RX ORDER — PEN NEEDLE, DIABETIC 30 GX3/16"
NEEDLE, DISPOSABLE MISCELLANEOUS
Qty: 100 PEN NEEDLE | Refills: 11 | Status: SHIPPED | OUTPATIENT
Start: 2019-11-08

## 2019-11-12 RX ORDER — DICLOFENAC SODIUM 75 MG/1
75 TABLET, DELAYED RELEASE ORAL 2 TIMES DAILY
Qty: 180 TAB | Refills: 0 | Status: SHIPPED | OUTPATIENT
Start: 2019-11-12 | End: 2020-01-09 | Stop reason: ALTCHOICE

## 2019-11-15 NOTE — TELEPHONE ENCOUNTER
Requested Prescriptions     Pending Prescriptions Disp Refills    glucose blood VI test strips (FREESTYLE INSULINX TEST STRIPS) strip 100 Strip 1     Sig: E11.9 Use to test blood glucose daily. Patient request strips be refilled    Patient is questioning how often he is suppose to be testing. He has different instructions and wants a clear indication of when to test as well.

## 2020-01-09 ENCOUNTER — OFFICE VISIT (OUTPATIENT)
Dept: FAMILY MEDICINE CLINIC | Age: 60
End: 2020-01-09

## 2020-01-09 VITALS
SYSTOLIC BLOOD PRESSURE: 138 MMHG | DIASTOLIC BLOOD PRESSURE: 85 MMHG | BODY MASS INDEX: 32.51 KG/M2 | RESPIRATION RATE: 18 BRPM | HEART RATE: 73 BPM | HEIGHT: 72 IN | OXYGEN SATURATION: 96 % | WEIGHT: 240 LBS | TEMPERATURE: 97.5 F

## 2020-01-09 DIAGNOSIS — I25.10 CORONARY ARTERY DISEASE INVOLVING NATIVE CORONARY ARTERY OF NATIVE HEART WITHOUT ANGINA PECTORIS: ICD-10-CM

## 2020-01-09 DIAGNOSIS — E78.00 PURE HYPERCHOLESTEROLEMIA: ICD-10-CM

## 2020-01-09 DIAGNOSIS — E11.9 TYPE 2 DIABETES MELLITUS WITHOUT COMPLICATION, WITHOUT LONG-TERM CURRENT USE OF INSULIN (HCC): Primary | ICD-10-CM

## 2020-01-09 LAB — HBA1C MFR BLD HPLC: 6.8 %

## 2020-01-09 NOTE — PROGRESS NOTES
Aleah Arechiga is a 62 y.o. male (: 1960) presenting to address:    Chief Complaint   Patient presents with    Diabetes     follow up       Vitals:    20 1421   BP: 138/85   Pulse: 73   Resp: 18   Temp: 97.5 °F (36.4 °C)   TempSrc: Oral   SpO2: 96%   Weight: 240 lb (108.9 kg)   Height: 6' (1.829 m)   PainSc:   7       Hearing/Vision:   No exam data present    Learning Assessment:     Learning Assessment 2/10/2015   PRIMARY LEARNER Patient   HIGHEST LEVEL OF EDUCATION - PRIMARY LEARNER  > 4 YEARS OF COLLEGE   BARRIERS PRIMARY LEARNER NONE   CO-LEARNER CAREGIVER No   PRIMARY LANGUAGE ENGLISH   LEARNER PREFERENCE PRIMARY DEMONSTRATION     -   ANSWERED BY patient   RELATIONSHIP SELF     Depression Screening:     3 most recent PHQ Screens 2020   PHQ Not Done -   Little interest or pleasure in doing things Not at all   Feeling down, depressed, irritable, or hopeless Not at all   Total Score PHQ 2 0   Trouble falling or staying asleep, or sleeping too much -   Feeling tired or having little energy -   Poor appetite, weight loss, or overeating -   Feeling bad about yourself - or that you are a failure or have let yourself or your family down -   Trouble concentrating on things such as school, work, reading, or watching TV -   Moving or speaking so slowly that other people could have noticed; or the opposite being so fidgety that others notice -   Thoughts of being better off dead, or hurting yourself in some way -   PHQ 9 Score -   How difficult have these problems made it for you to do your work, take care of your home and get along with others -     Fall Risk Assessment:     Fall Risk Assessment, last 12 mths 2019   Able to walk? Yes   Fall in past 12 months? No     Abuse Screening:     Abuse Screening Questionnaire 2019   Do you ever feel afraid of your partner? N   Are you in a relationship with someone who physically or mentally threatens you? N   Is it safe for you to go home?  Nisa Chery Coordination of Care Questionaire:   1. Have you been to the ER, urgent care clinic since your last visit? Hospitalized since your last visit? NO    2. Have you seen or consulted any other health care providers outside of the [address] [place] [address] since your last visit? Include any pap smears or colon screening. NO    Advanced Directive:   1. Do you have an Advanced Directive? YES    2. Would you like information on Advanced Directives?  NO

## 2020-01-09 NOTE — PROGRESS NOTES
Assessment/Plan:    1. Type 2 diabetes mellitus without complication, without long-term current use of insulin ([facility])  -change victoza to bydureon for cost. F/u in 3 mos  - AMB POC HEMOGLOBIN A1C  - CBC W/O DIFF; Future  - MICROALBUMIN, UR, RAND W/ MICROALB/CREAT RATIO; Future  - exenatide microspheres (BYDUREON) 2 mg/0.65 mL pnij; 0.65 mL by SubCUTAneous route every seven (7) days. Dispense: 12 Pen; Refill: 0  - REFERRAL TO PODIATRY    2. Coronary artery disease involving native coronary artery of native heart without angina pectoris  -ck labs  - CBC W/O DIFF; Future  - METABOLIC PANEL, COMPREHENSIVE; Future  - LIPID PANEL; Future    3. Pure hypercholesterolemia  -cont statin  - METABOLIC PANEL, COMPREHENSIVE; Future  - LIPID PANEL; Future      The plan was discussed with the patient. The patient verbalized understanding and is in agreement with the plan. All medication potential side effects were discussed with the patient. Health Maintenance:   Health Maintenance   Topic Date Due    HEMOGLOBIN A1C Q6M  04/04/2020    FOOT EXAM Q1  05/01/2020    EYE EXAM RETINAL OR DILATED  08/07/2020    MICROALBUMIN Q1  08/21/2020    LIPID PANEL Q1  10/04/2020    COLONOSCOPY  01/28/2021    DTaP/Tdap/Td series (2 - Td) 12/04/2023    Hepatitis C Screening  Completed    Shingrix Vaccine Age 50>  Completed    Influenza Age 5 to Adult  Completed    Pneumococcal 0-64 years  Completed       Dominique Lopez is a 62 y.o. male and presents with Diabetes (follow up)     Subjective:  DM2- brings in bs log.  bs running <140s for the most part. States he's compliant with meds. Memory impairment-  [place] is ok. ROS:  Constitutional: No recent weight change. No weakness/fatigue. No f/c. Cardiovascular: No CP/palpitations. No CARDOZA/orthopnea/PND. Respiratory: No cough/sputum, dyspnea, wheezing. Gastointestinal: No dysphagia, reflux. No n/v. No constipation/diarrhea. No melena/rectal bleeding.      The problem list was updated as a part of today's visit. Patient Active Problem List   Diagnosis Code    Bilateral wrist pain M25.531, M25.532    History of carpal tunnel release Z98.890    Exposure to hepatitis B Z20.5    Post-traumatic osteonecrosis of left ankle (HCC) M87.272    HLD (hyperlipidemia) E78.5    Hemochromatosis N25.152    Mild diastolic dysfunction K47.9    Benign prostatic hyperplasia with lower urinary tract symptoms N40.1    Hypogonadism in male E29.1    Fibrosis of liver K74.0    Type 2 diabetes mellitus without complication, without long-term current use of insulin (HCC) E11.9    Liver lesion, right lobe K76.9    Agatston coronary artery calcium score greater than 400 R93.1    Class 1 obesity due to excess calories with serious comorbidity and body mass index (BMI) of 32.0 to 32.9 in adult E66.09, Z68.32    Coronary artery disease involving native coronary artery of native heart without angina pectoris I25.10       The PSH, FH were reviewed. SH:  Social History     Tobacco Use    Smoking status: Former Smoker     Packs/day: 0.50     Years: 45.00     Pack years: 22.50     Types: Cigarettes     Last attempt to quit: 5/1/2016     Years since quitting: 3.6    Smokeless tobacco: Never Used   Substance Use Topics    Alcohol use: Yes     Comment: very little    Drug use: No       Medications/Allergies:  Current Outpatient Medications on File Prior to Visit   Medication Sig Dispense Refill    glucose blood VI test strips (FREESTYLE INSULINX TEST STRIPS) strip E11.9 Use to test blood glucose daily. 100 Strip 1    Insulin Needles, Disposable, 31 gauge x 5/16\" ndle Inject daily with victoza 100 Pen Needle 11    lancets (FREESTYLE LANCETS) 28 gauge misc Freestyle Insulinx lancets. Use to test blood glucose daily. E11.9 100 Lancet 1    atorvastatin (LIPITOR) 40 mg tablet Take 1 Tab by mouth daily.  Indications: hardening of the arteries due to plaque buildup 90 Tab 3    glimepiride (AMARYL) 4 mg tablet Take 1 Tab by mouth two (2) times a day. 180 Tab 0    aspirin delayed-release 81 mg tablet Take 1 Tab by mouth daily. 90 Tab 3    [DISCONTINUED] liraglutide (VICTOZA) 0.6 mg/0.1 mL (18 mg/3 mL) pnij Inject 0.6mg SC daily x 1 week, then increase to 1.2mg SC daily 5 Pen 0    [DISCONTINUED] diclofenac EC (VOLTAREN) 75 mg EC tablet Take 1 Tab by mouth two (2) times a day. 180 Tab 0    [DISCONTINUED] FREESTYLE SHARAN 14 DAY SENSOR kit USE TO TEST BLOOD SUGAR DAILY 2 Kit 0    [DISCONTINUED] lancets (ONETOUCH DELICA LANCETS) 30 gauge misc Test bs daily E11.9 100 Lancet 3    [DISCONTINUED] glucose blood VI test strips (ONETOUCH ULTRA TEST) strip Test bs daily. E11.9 100 Strip 3     No current facility-administered medications on file prior to visit. Allergies   Allergen Reactions    Codeine Itching       Objective:  Visit Vitals  /85 (BP 1 Location: Left arm, BP Patient Position: Sitting)   Pulse 73   Temp 97.5 °F (36.4 °C) (Oral)   Resp 18   Ht 6' (1.829 m)   Wt 240 lb (108.9 kg)   SpO2 96%   BMI 32.55 kg/m²      Constitutional: Well developed, nourished, no distress, alert, obese habitus   CV: S1, S2.  RRR. No murmurs/rubs. No edema. Pulm: No abnormalities on inspection. Clear to auscultation bilaterally. No wheezing/rhonchi. Normal effort. Psych: Appropriate affect, judgement and insight. Short-term memory intact. [place] 26/30.

## 2020-02-04 DIAGNOSIS — E11.9 TYPE 2 DIABETES MELLITUS WITHOUT COMPLICATION, WITHOUT LONG-TERM CURRENT USE OF INSULIN (HCC): ICD-10-CM

## 2020-02-14 RX ORDER — DICLOFENAC SODIUM 75 MG/1
75 TABLET, DELAYED RELEASE ORAL 2 TIMES DAILY
Qty: 180 TAB | Refills: 0 | Status: SHIPPED | OUTPATIENT
Start: 2020-02-14 | End: 2020-05-21

## 2020-03-13 ENCOUNTER — OFFICE VISIT (OUTPATIENT)
Dept: FAMILY MEDICINE CLINIC | Age: 60
End: 2020-03-13

## 2020-03-13 VITALS
SYSTOLIC BLOOD PRESSURE: 91 MMHG | TEMPERATURE: 97.8 F | HEART RATE: 77 BPM | HEIGHT: 72 IN | WEIGHT: 239 LBS | OXYGEN SATURATION: 97 % | BODY MASS INDEX: 32.37 KG/M2 | RESPIRATION RATE: 18 BRPM | DIASTOLIC BLOOD PRESSURE: 59 MMHG

## 2020-03-13 DIAGNOSIS — M25.579 CHRONIC ANKLE PAIN, UNSPECIFIED LATERALITY: ICD-10-CM

## 2020-03-13 DIAGNOSIS — G89.29 CHRONIC ANKLE PAIN, UNSPECIFIED LATERALITY: ICD-10-CM

## 2020-03-13 DIAGNOSIS — I25.10 CORONARY ARTERY DISEASE INVOLVING NATIVE CORONARY ARTERY OF NATIVE HEART WITHOUT ANGINA PECTORIS: ICD-10-CM

## 2020-03-13 DIAGNOSIS — E83.110 HEREDITARY HEMOCHROMATOSIS (HCC): ICD-10-CM

## 2020-03-13 DIAGNOSIS — E11.9 TYPE 2 DIABETES MELLITUS WITHOUT COMPLICATION, WITHOUT LONG-TERM CURRENT USE OF INSULIN (HCC): Primary | ICD-10-CM

## 2020-03-13 DIAGNOSIS — Z01.810 PREOP CARDIOVASCULAR EXAM: ICD-10-CM

## 2020-03-13 RX ORDER — TRAMADOL HYDROCHLORIDE 50 MG/1
50 TABLET ORAL
Qty: 60 TAB | Refills: 0 | Status: SHIPPED | OUTPATIENT
Start: 2020-03-13 | End: 2020-04-12

## 2020-03-13 NOTE — PROGRESS NOTES
Noralyn Fothergill is a 62 y.o. male and presents for pre-operative evaluation.     Subjective: This patient was seen at the request of Dr. Yung Mattson for pre-operative evaluation for planned procedure: L total ankle arthroplasty with possible achilles tendon lengthening on 4/17/2020 under general anesthesia.      Cardiac factors include:  CAD (severe coronary calcifications on cardiac CT), calcium score 540/92%ile  DMII, a1c 6.8 8/8/4116  Mild diastolic CHF, preserved EF     METs: 4     ROS:  Constitutional: No recent weight change. No weakness/fatigue. No f/c. Skin: No rashes, change in nails/hair, itching   HENT: No HA, dizziness. No hearing loss/tinnitus. No nasal congestion/discharge. Eyes: No change in vision, double/blurred vision or eye pain/redness. Cardiovascular: No CP/palpitations. No CARDOZA/orthopnea/PND. Respiratory: No cough/sputum, dyspnea, wheezing. Gastointestinal: No dysphagia, reflux. No n/v. No constipation/diarrhea. No melena/rectal bleeding. Genitourinary: No dysuria, urinary hesitancy, nocturia, hematuria. No incontinence. Musculoskeletal: + joint pain. No muscle pain/tenderness. Heme: No h/o anemia. No easy bleeding/bruising. Neurological: No seizures/numbness/weakness.   No paresthesias.      PMH:       Patient Active Problem List   Diagnosis Code    Bilateral wrist pain M25.531, M25.532    History of carpal tunnel release Z98.890    Exposure to hepatitis B Z20.5    Post-traumatic osteonecrosis of left ankle (HCC) M87.272    HLD (hyperlipidemia) E78.5    Hemochromatosis Z70.908    Mild diastolic dysfunction C28.1    Benign prostatic hyperplasia with lower urinary tract symptoms N40.1    Hypogonadism in male E29.1    Fibrosis of liver K74.0    Type 2 diabetes mellitus without complication, without long-term current use of insulin (HCC) E11.9    Liver lesion, right lobe K76.9    Agatston coronary artery calcium score greater than 400 R93.1    Class 1 obesity due to excess calories with serious comorbidity and body mass index (BMI) of 32.0 to 32.9 in adult E66.09, Z68.32         PSH:        Past Surgical History:   Procedure Laterality Date    HX CARPAL TUNNEL RELEASE        HX COLONOSCOPY   2/8/2016     Repeat colonoscopy in 5 yrs per Dr. Gale Areas HX HIP REPLACEMENT         right hip x 2    HX ORTHOPAEDIC         bilateral elbows/wrist         SH:  Social History            Tobacco Use    Smoking status: Former Smoker       Packs/day: 0.50       Years: 45.00       Pack years: 22.50       Types: Cigarettes       Last attempt to quit: 5/1/2016       Years since quitting: 3.4    Smokeless tobacco: Never Used   Substance Use Topics    Alcohol use: Yes       Comment: very little    Drug use: No         FH:        Family History   Problem Relation Age of Onset    Alcohol abuse Mother      Suicide Mother      Heart Attack Father           Medications/Allergies:         Current Outpatient Medications on File Prior to Visit   Medication Sig Dispense Refill    glimepiride (AMARYL) 4 mg tablet Take 1 Tab by mouth two (2) times a day. 180 Tab 0    aspirin delayed-release 81 mg tablet Take 1 Tab by mouth daily. 90 Tab 3    diclofenac EC (VOLTAREN) 75 mg EC tablet TAKE ONE TABLET BY MOUTH TWICE DAILY AS NEEDED 60 Tab 11    lancets (ONETOUCH DELICA LANCETS) 30 gauge misc Test bs daily E11.9 100 Lancet 3    glucose blood VI test strips (ONETOUCH ULTRA TEST) strip Test bs daily. E11.9 100 Strip 3    metoprolol succinate (TOPROL-XL) 25 mg XL tablet Take 1 Tab by mouth daily. 30 Tab 6    atorvastatin (LIPITOR) 40 mg tablet Take 1 Tab by mouth daily. 90 Tab 1    sildenafil citrate (VIAGRA) 50 mg tablet Take 1 Tab by mouth daily as needed. 9 Tab 11      No current facility-administered medications on file prior to visit.             Allergies   Allergen Reactions    Codeine Itching         Objective:  Visit Vitals  /75 (BP 1 Location: Right arm, BP Patient Position: Sitting)   Pulse 64   Temp 97.1 °F (36.2 °C) (Oral)   Resp 18   Ht 6' (1.829 m)   Wt 235 lb (106.6 kg)   SpO2 94%   BMI 31.87 kg/m²      Gen: Well developed, nourished, no distress, alert, obese habitus   HENT: Exterior ears and tympanic membranes normal bilaterally. Supple neck. No thyromegaly or lymphadenopathy. Oropharynx clear and moist mucous membranes. Eyes: Conjunctiva normal. PERRL. CV: S1, S2.  RRR. No murmurs/rubs. No thrills palpated. No carotid bruits. Intact distal pulses. No edema. Pulm: No abnormalities on inspection. Clear to auscultation bilaterally. No wheezing/rhonchi. Normal effort. GI: Soft, nontender, nondistended. Normal active bowel sounds. No  masses on palpation. No hepatosplenomegaly. Neuro: A/O x 3. No focal motor or sensory deficits. Speech normal.   Skin: No lesions/rashes on inspection. Psych: Appropriate affect, judgement and insight. Limited insight into medical condition.      Labwork and Ancillary Studies:   CXR 3/2020: nothing acute  EKG: 3/2020 NSR, no ST changes. Assessment/Plan:    The patient is moderate risk for planned procedure given his co-morbidities. However, he is optimized for surgery as his A1c is 6.8. He can proceed to OR w/o further testing.      The following recommendations were made for medication regimen prior to surgery:  Stop ASA one week prior to surgery  Hold amaryl on the day of surgery. Hold NSAIDs 7 days prior to surgery.     I will continue to follow along with the patient's treatment and care.   For any questions please do not hesitate to call the office at [phone].

## 2020-03-13 NOTE — PROGRESS NOTES
Geetha Stauffer is a 62 y.o. male (: 1960) presenting to address:    Chief Complaint   Patient presents with    Pre-op Exam    Ankle Pain       Vitals:    20 1328   BP: 91/59   Pulse: 77   Resp: 18   Temp: 97.8 °F (36.6 °C)   TempSrc: Oral   SpO2: 97%   Weight: 239 lb (108.4 kg)   Height: 6' (1.829 m)   PainSc:   9       Hearing/Vision:   No exam data present    Learning Assessment:     Learning Assessment 2/10/2015   PRIMARY LEARNER Patient   HIGHEST LEVEL OF EDUCATION - PRIMARY LEARNER  > 4 YEARS OF COLLEGE   BARRIERS PRIMARY LEARNER NONE   CO-LEARNER CAREGIVER No   PRIMARY LANGUAGE ENGLISH   LEARNER PREFERENCE PRIMARY DEMONSTRATION     -   ANSWERED BY patient   RELATIONSHIP SELF     Depression Screening:     3 most recent PHQ Screens 3/13/2020   PHQ Not Done -   Little interest or pleasure in doing things Not at all   Feeling down, depressed, irritable, or hopeless Not at all   Total Score PHQ 2 0   Trouble falling or staying asleep, or sleeping too much -   Feeling tired or having little energy -   Poor appetite, weight loss, or overeating -   Feeling bad about yourself - or that you are a failure or have let yourself or your family down -   Trouble concentrating on things such as school, work, reading, or watching TV -   Moving or speaking so slowly that other people could have noticed; or the opposite being so fidgety that others notice -   Thoughts of being better off dead, or hurting yourself in some way -   PHQ 9 Score -   How difficult have these problems made it for you to do your work, take care of your home and get along with others -     Fall Risk Assessment:     Fall Risk Assessment, last 12 mths 2019   Able to walk? Yes   Fall in past 12 months? No     Abuse Screening:     Abuse Screening Questionnaire 2019   Do you ever feel afraid of your partner? N   Are you in a relationship with someone who physically or mentally threatens you? N   Is it safe for you to go home?  Susie Honeycutt Coordination of Care Questionaire:   1. Have you been to the ER, urgent care clinic since your last visit? Hospitalized since your last visit? NO    2. Have you seen or consulted any other health care providers outside of the [address] [place] [address] since your last visit? Include any pap smears or colon screening. NO    Advanced Directive:   1. Do you have an Advanced Directive? YES    2. Would you like information on Advanced Directives?  NO

## 2020-03-31 ENCOUNTER — TELEPHONE (OUTPATIENT)
Dept: FAMILY MEDICINE CLINIC | Age: 60
End: 2020-03-31

## 2020-04-01 NOTE — TELEPHONE ENCOUNTER
Note recd from pt's [facility] no prior [facility] is required. This is formulary. Faxed to pharmacy.

## 2020-04-02 NOTE — PROGRESS NOTES
[facility] THERAPY   [facility], [address] [place], [place], [address] [facility] - Phone: [phone]  Fax: [phone]  DISCHARGE SUMMARY  Patient Name: Nini Del Angel : 1960   Treatment/Medical Diagnosis: Left ankle pain [M25.572]   Referral Source: Cesar Lizarraga MD     Date of Initial Visit: 19 Attended Visits: 13 Missed Visits: 2     SUMMARY OF TREATMENT  Pt attended 13 PT sessions, including an initial evaluation, for L ankle pain. PT has included therapeutic exercises, manual therapy, patient education, and home exercise program to improve L ankle ROM, flexibility, strength, and endurance. Utilization of cold pack to prevent soreness and decrease pain. CURRENT STATUS  Patient was making minimal to no progress with PT interventions for L ankle pain indicated by no change with pain levels and ankle mobility. Pt attended 4 f/u appts since last PN (19) and was last seen on 19. Pt did not return to physical therapy, therefore a formal reassessment of goals was not performed and patient is discharged from PT. Goal/Measure of Progress Goal Met? 1.  Pt to be independent and compliant with long-term HEP to maintain gains made from physical therapy. Status at last Eval: Compliant with basic HEP Current Status: Unable to assess n/a   2. Pt will maintain SLS x 20\" on Airex pad to improve ankle stability ambulating on unlevel surfaces   Status at last Eval: Able to perform SLS on ground for 20 seconds Current Status: Unable to assess n/a   3. Improve FOTO score to >/= 47/100 to indicate improved function   Status at last Eval: 30/100 Current Status: Unable to assess n/a     RECOMMENDATIONS  Other: Discontinue therapy due to patient not returning. Thank you for this referral.     If you have any questions/comments please contact us directly at [phone]. Thank you for allowing us to assist in the care of your patient.     Therapist Signature: Molly Crow, LPTA Date: 04/02/20    Marga Collazo, PT, DPT, CMTPT Time: 12:53 PM

## 2020-04-16 DIAGNOSIS — M87.272: Primary | ICD-10-CM

## 2020-05-21 RX ORDER — DICLOFENAC SODIUM 75 MG/1
TABLET, DELAYED RELEASE ORAL
Qty: 180 TAB | Refills: 0 | Status: SHIPPED | OUTPATIENT
Start: 2020-05-21 | End: 2020-11-10 | Stop reason: SDUPTHER

## 2020-06-03 ENCOUNTER — OFFICE VISIT (OUTPATIENT)
Dept: FAMILY MEDICINE CLINIC | Age: 60
End: 2020-06-03

## 2020-06-03 VITALS
TEMPERATURE: 96.8 F | HEART RATE: 73 BPM | RESPIRATION RATE: 20 BRPM | WEIGHT: 229 LBS | BODY MASS INDEX: 31.02 KG/M2 | HEIGHT: 72 IN | OXYGEN SATURATION: 98 % | DIASTOLIC BLOOD PRESSURE: 80 MMHG | SYSTOLIC BLOOD PRESSURE: 130 MMHG

## 2020-06-03 DIAGNOSIS — M87.272: ICD-10-CM

## 2020-06-03 DIAGNOSIS — E11.9 TYPE 2 DIABETES MELLITUS WITHOUT COMPLICATION, WITHOUT LONG-TERM CURRENT USE OF INSULIN (HCC): Primary | ICD-10-CM

## 2020-06-03 DIAGNOSIS — E78.00 PURE HYPERCHOLESTEROLEMIA: ICD-10-CM

## 2020-06-03 DIAGNOSIS — I25.10 CORONARY ARTERY DISEASE INVOLVING NATIVE CORONARY ARTERY OF NATIVE HEART WITHOUT ANGINA PECTORIS: ICD-10-CM

## 2020-06-03 LAB — HBA1C MFR BLD HPLC: 5.1 %

## 2020-06-03 RX ORDER — CONTAINER,EMPTY
EACH MISCELLANEOUS
Qty: 1 EACH | Refills: 0 | Status: SHIPPED | OUTPATIENT
Start: 2020-06-03

## 2020-06-03 NOTE — PATIENT INSTRUCTIONS
[facility] Dr. Jessenia Jacobs, Dr. Usman Duron [facility], [address] [facility], [address] [place] [address] 
 [phone] [facility] Dermatology Dr. Clovis Ellington [facility], [address] [facility] [phone]

## 2020-06-03 NOTE — PROGRESS NOTES
Anny Mack is a 56 y.o. male and presents for pre-operative evaluation. Subjective: This patient was seen at the request of Dr. Mariah Velasco for pre-operative evaluation for planned procedure: L total ankle arthroplasty, possible achilles tendon lengthening on 6/19/20 under general anesthesia. Cardiac factors include:  CAD  HLD  DM2, 5.1    METs: 4    ROS:  Constitutional: No recent weight change. No weakness/fatigue. No f/c. Skin: No rashes, change in nails/hair, itching   HENT: No HA, dizziness. No hearing loss/tinnitus. No nasal congestion/discharge. Eyes: No change in vision, double/blurred vision or eye pain/redness. Cardiovascular: No CP/palpitations. No CARDOZA/orthopnea/PND. Respiratory: No cough/sputum, dyspnea, wheezing. Gastointestinal: No dysphagia, reflux. No n/v. No constipation/diarrhea. No melena/rectal bleeding. Genitourinary: No dysuria, urinary hesitancy, nocturia, hematuria. No incontinence. Musculoskeletal: + joint pain/stiffness. No muscle pain/tenderness. Heme: No h/o anemia. No easy bleeding/bruising. Neurological: No seizures/numbness/weakness. No paresthesias.      PMH:  Patient Active Problem List   Diagnosis Code    Bilateral wrist pain M25.531, M25.532    History of carpal tunnel release Z98.890    Exposure to hepatitis B Z20.5    Post-traumatic osteonecrosis of left ankle (HCC) M87.272    HLD (hyperlipidemia) E78.5    Hemochromatosis D09.549    Mild diastolic dysfunction W65.2    Benign prostatic hyperplasia with lower urinary tract symptoms N40.1    Hypogonadism in male E29.1    Fibrosis of liver K74.0    Type 2 diabetes mellitus without complication, without long-term current use of insulin (HCC) E11.9    Liver lesion, right lobe K76.9    Agatston coronary artery calcium score greater than 400 R93.1    Class 1 obesity due to excess calories with serious comorbidity and body mass index (BMI) of 32.0 to 32.9 in adult E66.09, Z68.32    Coronary artery disease involving native coronary artery of native heart without angina pectoris I25.10       PSH:  Past Surgical History:   Procedure Laterality Date    HX CARPAL TUNNEL RELEASE      HX COLONOSCOPY  2016    Repeat colonoscopy in 5 yrs per Dr. Tatum Coppola      right hip x 2    HX ORTHOPAEDIC      bilateral elbows/wrist        SH:  Social History     Tobacco Use    Smoking status: Former Smoker     Packs/day: 0.50     Years: 45.00     Pack years: 22.50     Types: Cigarettes     Last attempt to quit: 2016     Years since quittin.0    Smokeless tobacco: Never Used   Substance Use Topics    Alcohol use: Yes     Comment: very little    Drug use: No       FH:  Family History   Problem Relation Age of Onset    Alcohol abuse Mother     Suicide Mother     Heart Attack Father        Medications/Allergies:  Current Outpatient Medications on File Prior to Visit   Medication Sig Dispense Refill    diclofenac EC (VOLTAREN) 75 mg EC tablet Take 1 tablet by mouth twice daily 180 Tab 0    glimepiride (AMARYL) 4 mg tablet Take 1 Tab by mouth two (2) times a day. 180 Tab 0    exenatide microspheres (BYDUREON) 2 mg/0.65 mL pnij 0.65 mL by SubCUTAneous route every seven (7) days. 12 Pen 0    glucose blood VI test strips (FREESTYLE INSULINX TEST STRIPS) strip E11.9 Use to test blood glucose daily. 100 Strip 1    Insulin Needles, Disposable, 31 gauge x 5/16\" ndle Inject daily with victoza 100 Pen Needle 11    lancets (FREESTYLE LANCETS) 28 gauge misc Freestyle Insulinx lancets. Use to test blood glucose daily. E11.9 100 Lancet 1    atorvastatin (LIPITOR) 40 mg tablet Take 1 Tab by mouth daily. Indications: hardening of the arteries due to plaque buildup 90 Tab 3    aspirin delayed-release 81 mg tablet Take 1 Tab by mouth daily. 90 Tab 3     No current facility-administered medications on file prior to visit.        Allergies   Allergen Reactions    Codeine Itching       Objective:  Visit Vitals  /80 (BP 1 Location: Right arm)   Pulse 73   Temp 96.8 °F (36 °C) (Oral)   Resp 20   Ht 6' (1.829 m)   Wt 229 lb (103.9 kg)   SpO2 98%   BMI 31.06 kg/m²      Gen: Well developed, nourished, no distress, alert, obese habitus   HENT: Exterior ears and tympanic membranes normal bilaterally. Supple neck. No thyromegaly or lymphadenopathy. Eyes: Conjunctiva normal. PERRL. CV: S1, S2.  RRR. No murmurs/rubs. No thrills palpated. No carotid bruits. Intact distal pulses. No edema. Pulm: No abnormalities on inspection. Clear to auscultation bilaterally. No wheezing/rhonchi. Normal effort. GI: Soft, nontender, nondistended. Normal active bowel sounds. No  masses on palpation. No hepatosplenomegaly. Neuro: A/O x 3. No focal motor or sensory deficits. Speech normal.   Psych: Appropriate affect, judgement and insight. Short-term memory intact. INR nml  CXR: nothing acute    EKG:NSR, no ST changes 3/2020  Assessment/Plan:    The patient is moderate risk given co-morbidities, but is considered optimized for surgery and may proceed to OR without further testing. The following recommendations were made for medication regimen prior to surgery:    Hold metformin and bydureon on the day of surgery. Hold NSAIDs and ASA 7 days prior to surgery. I will continue to follow along with the patient's treatment and care. For any questions please do not hesitate to call the office at [phone].       Sean Munguia MD

## 2020-06-03 NOTE — PROGRESS NOTES
Anny Mack is a 56 y.o. male (: 1960) presenting to address:    Chief Complaint   Patient presents with    Ankle Pain     left       Vitals:    20 1258 20 1305   BP: 140/80 130/80   Pulse: 73    Resp: 20    Temp: 96.8 °F (36 °C)    TempSrc: Oral    SpO2: 98%    Weight: 229 lb (103.9 kg)    Height: 6' (1.829 m)    PainSc:   4    PainLoc: Ankle        Hearing/Vision:   No exam data present    Learning Assessment:     Learning Assessment 2/10/2015   PRIMARY LEARNER Patient   HIGHEST LEVEL OF EDUCATION - PRIMARY LEARNER  > 4 YEARS OF COLLEGE   BARRIERS PRIMARY LEARNER NONE   CO-LEARNER CAREGIVER No   PRIMARY LANGUAGE ENGLISH   LEARNER PREFERENCE PRIMARY DEMONSTRATION     -   ANSWERED BY patient   RELATIONSHIP SELF     Depression Screening:     3 most recent PHQ Screens 3/13/2020   PHQ Not Done -   Little interest or pleasure in doing things Not at all   Feeling down, depressed, irritable, or hopeless Not at all   Total Score PHQ 2 0   Trouble falling or staying asleep, or sleeping too much -   Feeling tired or having little energy -   Poor appetite, weight loss, or overeating -   Feeling bad about yourself - or that you are a failure or have let yourself or your family down -   Trouble concentrating on things such as school, work, reading, or watching TV -   Moving or speaking so slowly that other people could have noticed; or the opposite being so fidgety that others notice -   Thoughts of being better off dead, or hurting yourself in some way -   PHQ 9 Score -   How difficult have these problems made it for you to do your work, take care of your home and get along with others -     Fall Risk Assessment:     Fall Risk Assessment, last 12 mths 2019   Able to walk? Yes   Fall in past 12 months? No     Abuse Screening:     Abuse Screening Questionnaire 2019   Do you ever feel afraid of your partner? N   Are you in a relationship with someone who physically or mentally threatens you?  Anjana Brady Is it safe for you to go home? Y     Coordination of Care Questionaire:   1. Have you been to the ER, urgent care clinic since your last visit? Hospitalized since your last visit? No     2. Have you seen or consulted any other health care providers outside of the [address] [place] [address] since your last visit? Include any pap smears or colon screening. Yes Dr Regina Beard     Advanced Directive:   1. Do you have an Advanced Directive? No   2. Would you like information on Advanced Directives? No       There are no preventive care reminders to display for this patient.

## 2020-06-05 LAB
A-G RATIO,AGRAT: 1.3 RATIO (ref 1.1–2.6)
ALBUMIN SERPL-MCNC: 4.2 G/DL (ref 3.5–5)
ALP SERPL-CCNC: 109 U/L (ref 40–125)
ALT SERPL-CCNC: 27 U/L (ref 5–40)
ANION GAP SERPL CALC-SCNC: 11.8 MMOL/L
AST SERPL W P-5'-P-CCNC: 24 U/L (ref 10–37)
BILIRUB SERPL-MCNC: 0.8 MG/DL (ref 0.2–1.2)
BUN SERPL-MCNC: 21 MG/DL (ref 6–22)
CALCIUM SERPL-MCNC: 9.7 MG/DL (ref 8.4–10.5)
CHLORIDE SERPL-SCNC: 103 MMOL/L (ref 98–110)
CHOLEST SERPL-MCNC: 119 MG/DL (ref 110–200)
CO2 SERPL-SCNC: 26 MMOL/L (ref 20–32)
CREAT SERPL-MCNC: 0.9 MG/DL (ref 0.8–1.6)
CREATININE, URINE: 197 MG/DL
ERYTHROCYTE [DISTWIDTH] IN BLOOD BY AUTOMATED COUNT: 13.2 % (ref 10–15.5)
GFRAA, 66117: >60
GFRNA, 66118: >60
GLOBULIN,GLOB: 3.2 G/DL (ref 2–4)
GLUCOSE SERPL-MCNC: 111 MG/DL (ref 70–99)
HCT VFR BLD AUTO: 45.6 % (ref 39.3–51.6)
HDLC SERPL-MCNC: 2.6 MG/DL (ref 0–5)
HDLC SERPL-MCNC: 46 MG/DL
HGB BLD-MCNC: 15.6 G/DL (ref 13.1–17.2)
LDL/HDL RATIO,LDHD: 1
LDLC SERPL CALC-MCNC: 46 MG/DL (ref 50–99)
MCH RBC QN AUTO: 33 PG (ref 26–34)
MCHC RBC AUTO-ENTMCNC: 34 G/DL (ref 31–36)
MCV RBC AUTO: 96 FL (ref 80–95)
MICROALB/CREAT RATIO, 140286: 6.6 (ref 0–30)
MICROALBUMIN,URINE RANDOM 140054: 13 MG/L (ref 0.1–17)
NON-HDL CHOLESTEROL, 011976: 73 MG/DL
PLATELET # BLD AUTO: 202 K/UL (ref 140–440)
PMV BLD AUTO: 8.9 FL (ref 9–13)
POTASSIUM SERPL-SCNC: 4.5 MMOL/L (ref 3.5–5.5)
PROT SERPL-MCNC: 7.4 G/DL (ref 6.2–8.1)
RBC # BLD AUTO: 4.74 M/UL (ref 3.8–5.8)
SODIUM SERPL-SCNC: 141 MMOL/L (ref 133–145)
TRIGL SERPL-MCNC: 137 MG/DL (ref 40–149)
VLDLC SERPL CALC-MCNC: 27 MG/DL (ref 8–30)
WBC # BLD AUTO: 8.5 K/UL (ref 4–11)

## 2020-06-29 DIAGNOSIS — E11.9 TYPE 2 DIABETES MELLITUS WITHOUT COMPLICATION, WITHOUT LONG-TERM CURRENT USE OF INSULIN (HCC): ICD-10-CM

## 2020-06-29 RX ORDER — EXENATIDE 2 MG/.65ML
2 INJECTION, SUSPENSION, EXTENDED RELEASE SUBCUTANEOUS
Qty: 12 PEN | Refills: 0 | Status: SHIPPED | OUTPATIENT
Start: 2020-06-29 | End: 2020-08-25 | Stop reason: SDUPTHER

## 2020-08-24 RX ORDER — ASPIRIN 81 MG/1
81 TABLET ORAL DAILY
Qty: 90 TAB | Refills: 3 | Status: SHIPPED | OUTPATIENT
Start: 2020-08-24 | End: 2020-10-05 | Stop reason: SDUPTHER

## 2020-08-24 NOTE — TELEPHONE ENCOUNTER
Pt is asking for 81 mg asa written by cardio last year.    Future Appointments   Date Time Provider Marcio Aquino   8/25/2020  1:00 PM Saba Jacobo  [address]

## 2020-08-25 ENCOUNTER — VIRTUAL VISIT (OUTPATIENT)
Dept: FAMILY MEDICINE CLINIC | Age: 60
End: 2020-08-25

## 2020-08-25 DIAGNOSIS — E11.9 TYPE 2 DIABETES MELLITUS WITHOUT COMPLICATION, WITHOUT LONG-TERM CURRENT USE OF INSULIN (HCC): ICD-10-CM

## 2020-08-25 DIAGNOSIS — L03.119 CELLULITIS OF LOWER EXTREMITY, UNSPECIFIED LATERALITY: Primary | ICD-10-CM

## 2020-08-25 RX ORDER — ATORVASTATIN CALCIUM 40 MG/1
40 TABLET, FILM COATED ORAL DAILY
Qty: 90 TAB | Refills: 3 | Status: SHIPPED | OUTPATIENT
Start: 2020-08-25 | End: 2020-10-05 | Stop reason: SDUPTHER

## 2020-08-25 RX ORDER — EXENATIDE 2 MG/.65ML
2 INJECTION, SUSPENSION, EXTENDED RELEASE SUBCUTANEOUS
Qty: 12 PEN | Refills: 0 | Status: SHIPPED | OUTPATIENT
Start: 2020-08-25 | End: 2020-11-10 | Stop reason: SDUPTHER

## 2020-08-25 RX ORDER — GLIMEPIRIDE 4 MG/1
4 TABLET ORAL 2 TIMES DAILY
Qty: 180 TAB | Refills: 0 | Status: SHIPPED | OUTPATIENT
Start: 2020-08-25 | End: 2020-10-05 | Stop reason: SDUPTHER

## 2020-08-25 NOTE — PROGRESS NOTES
Edilson Lopez is a 56 y.o. male who was seen by synchronous (real-time) audio-video technology on 8/25/2020 for Hospital Follow Up        Assessment & Plan:   Diagnoses and all orders for this visit:    1. Cellulitis of lower extremity, unspecified laterality  - finish zyvox    2. Type 2 diabetes mellitus without complication, without long-term current use of insulin ([address]- 6.2 this month. Cont current. Fu in 6mo  -     HEMOGLOBIN A1C W/O EAG; Future      Subjective:     Pt underwent ankle surgery 6/2020. He was recently admitted to hospital for cellulitis. He was d/c'd on zyvox- initially had side effects, but now tolerating. Saw Dr. Jaime Avalos yesterday, states he's improving. He's going to be 100% wt bearing next week with boot. dm2 - due for a1c in a few weeks. His bs are running high with the infection. Running up to 150s. He's following a diabetic diet. Prior to Admission medications    Medication Sig Start Date End Date Taking? Authorizing Provider   aspirin delayed-release 81 mg tablet Take 1 Tab by mouth daily. 8/24/20   Joshua Potter MD   exenatide microspheres (Bydureon) 2 mg/0.65 mL pnij 0.65 mL by SubCUTAneous route every seven (7) days. 6/29/20   Joshua Potter MD   Oral Medication Containers (SharpSafety Container) misc Use for sharps/diabetic needles 6/3/20   Joshua Potter MD   diclofenac EC (VOLTAREN) 75 mg EC tablet Take 1 tablet by mouth twice daily 5/21/20   Joshua Potter MD   glimepiride (AMARYL) 4 mg tablet Take 1 Tab by mouth two (2) times a day. 2/14/20   Joshua Potter MD   glucose blood VI test strips (FREESTYLE INSULINX TEST STRIPS) strip E11.9 Use to test blood glucose daily. 11/18/19   Joshua Potter MD   Insulin Needles, Disposable, 31 gauge x 5/16\" ndle Inject daily with victoza 11/8/19   Taylor Valentino MD   lancets (FREESTYLE LANCETS) 28 gauge misc Freestyle Insulinx lancets. Use to test blood glucose daily.  E11.9 10/28/19   Joshua Potter MD   atorvastatin (LIPITOR) 40 mg tablet Take 1 Tab by mouth daily. Indications: hardening of the arteries due to plaque buildup 10/23/19   Adriano Shelton MD     Patient Active Problem List   Diagnosis Code    Bilateral wrist pain M25.531, M25.532    History of carpal tunnel release Z98.890    Exposure to hepatitis B Z20.5    Post-traumatic osteonecrosis of left ankle (HCC) M87.272    HLD (hyperlipidemia) E78.5    Hemochromatosis V32.152    Mild diastolic dysfunction G73.5    Benign prostatic hyperplasia with lower urinary tract symptoms N40.1    Hypogonadism in male E29.1    Fibrosis of liver K74.0    Type 2 diabetes mellitus without complication, without long-term current use of insulin (HCC) E11.9    Liver lesion, right lobe K76.9    Agatston coronary artery calcium score greater than 400 R93.1    Class 1 obesity due to excess calories with serious comorbidity and body mass index (BMI) of 32.0 to 32.9 in adult E66.09, Z68.32    Coronary artery disease involving native coronary artery of native heart without angina pectoris I25.10       Review of Systems   Constitutional: Negative for chills and fever. Musculoskeletal: Positive for joint pain. Objective:   No flowsheet data found.      [INSTRUCTIONS:  \"[x]\" Indicates a positive item  \"[]\" Indicates a negative item  -- DELETE ALL ITEMS NOT EXAMINED]    Constitutional: [x] Appears well-developed and well-nourished [x] No apparent distress      [] Abnormal -     Mental status: [x] Alert and awake  [x] Oriented to person/place/time [x] Able to follow commands    [] Abnormal -     Eyes:   EOM    [x]  Normal    [] Abnormal -   Sclera  [x]  Normal    [] Abnormal -          Discharge [x]  None visible   [] Abnormal -     HENT: [x] Normocephalic, atraumatic  [] Abnormal -   [x] Mouth/Throat: Mucous membranes are moist    External Ears [x] Normal  [] Abnormal -    Neck: [x] No visualized mass [] Abnormal -     Pulmonary/Chest: [x] Respiratory effort normal   [x] No visualized signs of difficulty breathing or respiratory distress        [] Abnormal -      Musculoskeletal:   [] Normal gait with no signs of ataxia         [] Normal range of motion of neck        [] Abnormal -     Neurological:        [] No Facial Asymmetry (Cranial nerve 7 motor function) (limited exam due to video visit)          [] No gaze palsy        [] Abnormal -          Skin:        [] No significant exanthematous lesions or discoloration noted on facial skin         [] Abnormal -            Psychiatric:       [x] Normal Affect [] Abnormal -        [x] No Hallucinations    Other pertinent observable physical exam findings:-        We discussed the expected course, resolution and complications of the diagnosis(es) in detail. Medication risks, benefits, costs, interactions, and alternatives were discussed as indicated. I advised him to contact the office if his condition worsens, changes or fails to improve as anticipated. He expressed understanding with the diagnosis(es) and plan. Dillan Price, who was evaluated through a patient-initiated, synchronous (real-time) audio-video encounter, and/or his healthcare decision maker, is aware that it is a billable service, with coverage as determined by his insurance carrier. He provided verbal consent to proceed: Yes, and patient identification was verified. It was conducted pursuant to the emergency declaration under the [facility], [address] [place] [address] authority and the [employer] and [employer] Act. A caregiver was present when appropriate. Ability to conduct physical exam was limited. I was at home. The patient was at home.       Trace Vallecillo MD

## 2020-10-05 DIAGNOSIS — E11.9 TYPE 2 DIABETES MELLITUS WITHOUT COMPLICATION, WITHOUT LONG-TERM CURRENT USE OF INSULIN (HCC): ICD-10-CM

## 2020-10-05 RX ORDER — GLIMEPIRIDE 4 MG/1
4 TABLET ORAL 2 TIMES DAILY
Qty: 180 TAB | Refills: 0 | Status: SHIPPED | OUTPATIENT
Start: 2020-10-05 | End: 2020-11-16 | Stop reason: SDUPTHER

## 2020-10-05 RX ORDER — ASPIRIN 81 MG/1
81 TABLET ORAL DAILY
Qty: 90 TAB | Refills: 3 | Status: SHIPPED | OUTPATIENT
Start: 2020-10-05

## 2020-10-05 RX ORDER — ATORVASTATIN CALCIUM 40 MG/1
40 TABLET, FILM COATED ORAL DAILY
Qty: 90 TAB | Refills: 3 | Status: SHIPPED | OUTPATIENT
Start: 2020-10-05

## 2020-11-10 DIAGNOSIS — E11.9 TYPE 2 DIABETES MELLITUS WITHOUT COMPLICATION, WITHOUT LONG-TERM CURRENT USE OF INSULIN (HCC): ICD-10-CM

## 2020-11-12 RX ORDER — EXENATIDE 2 MG/.65ML
2 INJECTION, SUSPENSION, EXTENDED RELEASE SUBCUTANEOUS
Qty: 12 PEN | Refills: 0 | Status: SHIPPED | OUTPATIENT
Start: 2020-11-12

## 2020-11-12 RX ORDER — DICLOFENAC SODIUM 75 MG/1
75 TABLET, DELAYED RELEASE ORAL
Qty: 180 TAB | Refills: 1 | Status: SHIPPED | OUTPATIENT
Start: 2020-11-12

## 2020-11-16 DIAGNOSIS — E11.9 TYPE 2 DIABETES MELLITUS WITHOUT COMPLICATION, WITHOUT LONG-TERM CURRENT USE OF INSULIN (HCC): ICD-10-CM

## 2020-11-16 RX ORDER — GLIMEPIRIDE 4 MG/1
4 TABLET ORAL 2 TIMES DAILY
Qty: 180 TAB | Refills: 1 | Status: SHIPPED | OUTPATIENT
Start: 2020-11-16

## 2022-03-18 PROBLEM — E11.9 TYPE 2 DIABETES MELLITUS WITHOUT COMPLICATION, WITHOUT LONG-TERM CURRENT USE OF INSULIN (HCC): Status: ACTIVE | Noted: 2018-02-27

## 2022-03-18 PROBLEM — R93.1 AGATSTON CORONARY ARTERY CALCIUM SCORE GREATER THAN 400: Status: ACTIVE | Noted: 2019-02-15

## 2022-03-19 PROBLEM — K76.9 LIVER LESION, RIGHT LOBE: Status: ACTIVE | Noted: 2018-08-14

## 2022-03-19 PROBLEM — E66.09 CLASS 1 OBESITY DUE TO EXCESS CALORIES WITH SERIOUS COMORBIDITY AND BODY MASS INDEX (BMI) OF 32.0 TO 32.9 IN ADULT: Status: ACTIVE | Noted: 2019-05-01

## 2022-03-19 PROBLEM — K74.00 FIBROSIS OF LIVER: Status: ACTIVE | Noted: 2017-11-09

## 2022-03-19 PROBLEM — E29.1 HYPOGONADISM IN MALE: Status: ACTIVE | Noted: 2017-07-28

## 2022-03-20 PROBLEM — N40.1 BENIGN PROSTATIC HYPERPLASIA WITH LOWER URINARY TRACT SYMPTOMS: Status: ACTIVE | Noted: 2017-07-14

## 2022-03-20 PROBLEM — I25.10 CORONARY ARTERY DISEASE INVOLVING NATIVE CORONARY ARTERY OF NATIVE HEART WITHOUT ANGINA PECTORIS: Status: ACTIVE | Noted: 2019-10-24

## 2023-05-30 RX ORDER — EXENATIDE 2 MG/.65ML
INJECTION, SUSPENSION, EXTENDED RELEASE SUBCUTANEOUS
COMMUNITY
Start: 2020-11-12

## 2023-05-30 RX ORDER — ATORVASTATIN CALCIUM 40 MG/1
TABLET, FILM COATED ORAL DAILY
COMMUNITY
Start: 2020-10-05

## 2023-05-30 RX ORDER — DICLOFENAC SODIUM 75 MG/1
TABLET, DELAYED RELEASE ORAL 2 TIMES DAILY PRN
COMMUNITY
Start: 2020-11-12

## 2023-05-30 RX ORDER — GLIMEPIRIDE 4 MG/1
TABLET ORAL 2 TIMES DAILY
COMMUNITY
Start: 2020-11-16

## 2023-05-30 RX ORDER — ASPIRIN 81 MG/1
TABLET ORAL DAILY
COMMUNITY
Start: 2020-10-05